# Patient Record
Sex: MALE | Race: WHITE | NOT HISPANIC OR LATINO
[De-identification: names, ages, dates, MRNs, and addresses within clinical notes are randomized per-mention and may not be internally consistent; named-entity substitution may affect disease eponyms.]

---

## 2019-02-25 PROBLEM — Z00.00 ENCOUNTER FOR PREVENTIVE HEALTH EXAMINATION: Status: ACTIVE | Noted: 2019-02-25

## 2019-02-26 ENCOUNTER — APPOINTMENT (OUTPATIENT)
Dept: CARDIOLOGY | Facility: CLINIC | Age: 69
End: 2019-02-26
Payer: MEDICARE

## 2019-02-26 VITALS
WEIGHT: 155 LBS | OXYGEN SATURATION: 96 % | DIASTOLIC BLOOD PRESSURE: 80 MMHG | HEART RATE: 52 BPM | SYSTOLIC BLOOD PRESSURE: 144 MMHG | BODY MASS INDEX: 23.49 KG/M2 | HEIGHT: 68 IN | RESPIRATION RATE: 16 BRPM

## 2019-02-26 PROCEDURE — 99204 OFFICE O/P NEW MOD 45 MIN: CPT

## 2019-02-26 RX ORDER — ASPIRIN 81 MG/1
81 TABLET ORAL
Qty: 30 | Refills: 11 | Status: ACTIVE | COMMUNITY
Start: 2019-02-26 | End: 1900-01-01

## 2019-02-26 NOTE — REVIEW OF SYSTEMS
[Negative] : Heme/Lymph [Dyspnea on exertion] : not dyspnea during exertion [Chest Pain] : no chest pain [Lower Ext Edema] : no extremity edema [Leg Claudication] : no intermittent leg claudication

## 2019-02-26 NOTE — REASON FOR VISIT
[Consultation] : a consultation regarding [FreeTextEntry2] : Aortic aneurysm [FreeTextEntry1] : Nephrology:  Dr. Payne [id number]\par Cardio:  Dr. Renee \par \par 69 year old male arrhythmia, HTN, renal insufficiency smoker.  He had an ultrasound of the renal arteries summer of 2018, concern for renal artery stenosis.  CT with IV contrast was performed:\par CT 2019 with IV contrast. \par Circumferential mural thrombus in the abdominal aorta\par Occluded proximal SMA with distal reconstitution\par ? thrombus in Left renal artery\par Possible thoracic aneurysm.  \par Left kidney:  7.6cm\par Right kidney:  10/3cm\par \par He is not on an aspirin.  No arterial stents.  50 + year smoker.  He is here for discussion of CT results.  Reports that his father  of a brain aneurysm.  States his BP at home is 120-130/70.  Here his BP is 144/80.   He states he has a leg length discrepancy, which causes him back pain.  No pain in his calf with walking.  \par \par Meds\par Amlodipine 10mg daily\par Metoprolol 25mg \par not on statin therapy \par \par NKDA\par \par Fmhx:  HTN\par \par Social: \par Rare EOTH\par Tobacco\par \par \par Labs:  Creatinine 1.35\par LDL:  119\par \par

## 2019-02-26 NOTE — PHYSICAL EXAM
[General Appearance - Well Developed] : well developed [Normal Appearance] : normal appearance [Well Groomed] : well groomed [General Appearance - Well Nourished] : well nourished [No Deformities] : no deformities [General Appearance - In No Acute Distress] : no acute distress [Normal Conjunctiva] : the conjunctiva exhibited no abnormalities [Eyelids - No Xanthelasma] : the eyelids demonstrated no xanthelasmas [Normal Oral Mucosa] : normal oral mucosa [No Oral Pallor] : no oral pallor [No Oral Cyanosis] : no oral cyanosis [Normal Jugular Venous A Waves Present] : normal jugular venous A waves present [Normal Jugular Venous V Waves Present] : normal jugular venous V waves present [No Jugular Venous Mckee A Waves] : no jugular venous mckee A waves [Heart Rate And Rhythm] : heart rate and rhythm were normal [Heart Sounds] : normal S1 and S2 [Murmurs] : no murmurs present [Respiration, Rhythm And Depth] : normal respiratory rhythm and effort [Exaggerated Use Of Accessory Muscles For Inspiration] : no accessory muscle use [Auscultation Breath Sounds / Voice Sounds] : lungs were clear to auscultation bilaterally [Abdomen Soft] : soft [Abdomen Tenderness] : non-tender [Abdomen Mass (___ Cm)] : no abdominal mass palpated [Abnormal Walk] : normal gait [Gait - Sufficient For Exercise Testing] : the gait was sufficient for exercise testing [Nail Clubbing] : no clubbing of the fingernails [Cyanosis, Localized] : no localized cyanosis [Petechial Hemorrhages (___cm)] : no petechial hemorrhages [Skin Color & Pigmentation] : normal skin color and pigmentation [] : no rash [No Venous Stasis] : no venous stasis [Skin Lesions] : no skin lesions [No Skin Ulcers] : no skin ulcer [No Xanthoma] : no  xanthoma was observed [Oriented To Time, Place, And Person] : oriented to person, place, and time [Affect] : the affect was normal [Mood] : the mood was normal [No Anxiety] : not feeling anxious

## 2019-02-26 NOTE — ASSESSMENT
[FreeTextEntry1] : Assessment:\par 1. AAA 3.5 cm\par 2.  Aortic Mural thrombus\par 3.  SMA stenosis\par 4.  L renal artery stenosis with decreased kidney size\par 5.  CKD\par 6.  heavy active smoker\par \par \par Plan:\par 1.  Start aspirin 81mg daily\par 2.  Start high intensity statin therapy\par 3.  CT Angio chest, abdomen and pelvis to evaluate entire aorta and vasculature\par 4.  Smoking cessation. \par 5.  Return after #3 to review images

## 2019-03-05 ENCOUNTER — APPOINTMENT (OUTPATIENT)
Dept: CT IMAGING | Facility: CLINIC | Age: 69
End: 2019-03-05
Payer: MEDICARE

## 2019-03-05 ENCOUNTER — OUTPATIENT (OUTPATIENT)
Dept: OUTPATIENT SERVICES | Facility: HOSPITAL | Age: 69
LOS: 1 days | End: 2019-03-05
Payer: MEDICARE

## 2019-03-05 DIAGNOSIS — I71.4 ABDOMINAL AORTIC ANEURYSM, WITHOUT RUPTURE: ICD-10-CM

## 2019-03-05 DIAGNOSIS — I71.2 THORACIC AORTIC ANEURYSM, WITHOUT RUPTURE: ICD-10-CM

## 2019-03-05 PROCEDURE — 74174 CTA ABD&PLVS W/CONTRAST: CPT

## 2019-03-05 PROCEDURE — 71275 CT ANGIOGRAPHY CHEST: CPT | Mod: 26

## 2019-03-05 PROCEDURE — 82565 ASSAY OF CREATININE: CPT

## 2019-03-05 PROCEDURE — 74174 CTA ABD&PLVS W/CONTRAST: CPT | Mod: 26

## 2019-03-05 PROCEDURE — 71275 CT ANGIOGRAPHY CHEST: CPT

## 2019-03-12 ENCOUNTER — APPOINTMENT (OUTPATIENT)
Dept: CARDIOLOGY | Facility: CLINIC | Age: 69
End: 2019-03-12
Payer: MEDICARE

## 2019-03-12 VITALS
WEIGHT: 156 LBS | SYSTOLIC BLOOD PRESSURE: 130 MMHG | OXYGEN SATURATION: 98 % | HEIGHT: 68 IN | RESPIRATION RATE: 16 BRPM | BODY MASS INDEX: 23.64 KG/M2 | DIASTOLIC BLOOD PRESSURE: 76 MMHG | HEART RATE: 58 BPM

## 2019-03-12 PROCEDURE — 99214 OFFICE O/P EST MOD 30 MIN: CPT

## 2019-03-12 NOTE — ASSESSMENT
[FreeTextEntry1] : Assessment:\par 1. AAA 3.5 cm\par 2.  Aortic Mural thrombus\par 3.  SMA stenosis\par 4.  L renal artery stenosis with decreased kidney size\par 5.  CKD\par 6.  heavy active smoker\par \par \par Plan:\par 1.  Continue aspirin 81mg daily\par 2.  Continue with high intensity statin therapy\par 3.  MR Angio chest, abdomen and pelvis in 3 months for surveillance.   \par 4.  Smoking cessation stressed again.  He must stop smoking.  \par 5. See me after MRA in 3 months.

## 2019-03-12 NOTE — REASON FOR VISIT
[Follow-Up - Clinic] : a clinic follow-up of [FreeTextEntry2] : Aortic aneurysm [FreeTextEntry1] : 69 year old male arrhythmia, HTN, renal insufficiency smoker.

## 2019-03-12 NOTE — HISTORY OF PRESENT ILLNESS
[FreeTextEntry1] : followup visit:  3/12/2019\par Still smoking\par Started aspirin 81 and Crestor 40mg \par Has not quit\par No CP or SOB.\par Most recent creatinine is 1.57\par No abdominal pain. No claudication. \par \par \par Amlodipine 10mg daily\par Metoprolol 25mg BID\par Aspirin 81\par Crestor 40\par \par CTA:  \par VESSELS: Atherosclerotic disease throughout the abdominal aorta and its \par branches with extensive mural thrombus. At the level of the renal arteries, \par the aorta measures up to 3.7 cm. There is moderate to severe ostial \par narrowing of the celiac artery. A proximal SMA thrombosed aneurysm measures \par up to 1.1 cm in transverse dimension with reconstitution in the mid segment. \par The inferior mesenteric artery is demonstrated high-grade stenosis/occlusion \par at its origin, with reconstitution in the proximal segment.

## 2019-03-12 NOTE — PHYSICAL EXAM
[General Appearance - Well Developed] : well developed [Normal Appearance] : normal appearance [Well Groomed] : well groomed [General Appearance - Well Nourished] : well nourished [No Deformities] : no deformities [General Appearance - In No Acute Distress] : no acute distress [Normal Conjunctiva] : the conjunctiva exhibited no abnormalities [Eyelids - No Xanthelasma] : the eyelids demonstrated no xanthelasmas [Normal Oral Mucosa] : normal oral mucosa [No Oral Pallor] : no oral pallor [No Oral Cyanosis] : no oral cyanosis [Normal Jugular Venous A Waves Present] : normal jugular venous A waves present [Normal Jugular Venous V Waves Present] : normal jugular venous V waves present [No Jugular Venous Mckee A Waves] : no jugular venous mckee A waves [Respiration, Rhythm And Depth] : normal respiratory rhythm and effort [Exaggerated Use Of Accessory Muscles For Inspiration] : no accessory muscle use [Auscultation Breath Sounds / Voice Sounds] : lungs were clear to auscultation bilaterally [Heart Rate And Rhythm] : heart rate and rhythm were normal [Heart Sounds] : normal S1 and S2 [Murmurs] : no murmurs present [Abdomen Soft] : soft [Abdomen Tenderness] : non-tender [Abdomen Mass (___ Cm)] : no abdominal mass palpated [Abnormal Walk] : normal gait [Gait - Sufficient For Exercise Testing] : the gait was sufficient for exercise testing [Nail Clubbing] : no clubbing of the fingernails [Cyanosis, Localized] : no localized cyanosis [Petechial Hemorrhages (___cm)] : no petechial hemorrhages [Skin Color & Pigmentation] : normal skin color and pigmentation [] : no rash [No Venous Stasis] : no venous stasis [Skin Lesions] : no skin lesions [No Skin Ulcers] : no skin ulcer [No Xanthoma] : no  xanthoma was observed [Oriented To Time, Place, And Person] : oriented to person, place, and time [Affect] : the affect was normal [Mood] : the mood was normal [No Anxiety] : not feeling anxious

## 2019-05-28 ENCOUNTER — RX RENEWAL (OUTPATIENT)
Age: 69
End: 2019-05-28

## 2019-05-28 RX ORDER — ROSUVASTATIN CALCIUM 40 MG/1
40 TABLET, FILM COATED ORAL
Qty: 90 | Refills: 2 | Status: ACTIVE | COMMUNITY
Start: 2019-02-26 | End: 1900-01-01

## 2019-06-02 ENCOUNTER — APPOINTMENT (OUTPATIENT)
Dept: MRI IMAGING | Facility: CLINIC | Age: 69
End: 2019-06-02
Payer: MEDICARE

## 2019-06-02 ENCOUNTER — OUTPATIENT (OUTPATIENT)
Dept: OUTPATIENT SERVICES | Facility: HOSPITAL | Age: 69
LOS: 1 days | End: 2019-06-02
Payer: MEDICARE

## 2019-06-02 DIAGNOSIS — Z00.8 ENCOUNTER FOR OTHER GENERAL EXAMINATION: ICD-10-CM

## 2019-06-02 PROCEDURE — 72198 MR ANGIO PELVIS W/O & W/DYE: CPT | Mod: 26

## 2019-06-02 PROCEDURE — C8900: CPT

## 2019-06-02 PROCEDURE — A9585: CPT

## 2019-06-02 PROCEDURE — 74185 MRA ABD W OR W/O CNTRST: CPT | Mod: 26

## 2019-06-02 PROCEDURE — C8918: CPT

## 2019-06-12 ENCOUNTER — APPOINTMENT (OUTPATIENT)
Dept: MRI IMAGING | Facility: CLINIC | Age: 69
End: 2019-06-12
Payer: MEDICARE

## 2019-06-12 ENCOUNTER — OUTPATIENT (OUTPATIENT)
Dept: OUTPATIENT SERVICES | Facility: HOSPITAL | Age: 69
LOS: 1 days | End: 2019-06-12
Payer: MEDICARE

## 2019-06-12 DIAGNOSIS — Z00.8 ENCOUNTER FOR OTHER GENERAL EXAMINATION: ICD-10-CM

## 2019-06-12 PROCEDURE — 71555 MRI ANGIO CHEST W OR W/O DYE: CPT | Mod: 26

## 2019-06-12 PROCEDURE — C8911: CPT

## 2019-06-12 PROCEDURE — A9585: CPT

## 2019-06-18 ENCOUNTER — APPOINTMENT (OUTPATIENT)
Dept: CARDIOLOGY | Facility: CLINIC | Age: 69
End: 2019-06-18
Payer: MEDICARE

## 2019-06-18 VITALS
DIASTOLIC BLOOD PRESSURE: 72 MMHG | HEART RATE: 56 BPM | HEIGHT: 68 IN | WEIGHT: 152 LBS | SYSTOLIC BLOOD PRESSURE: 152 MMHG | OXYGEN SATURATION: 99 % | BODY MASS INDEX: 23.04 KG/M2 | RESPIRATION RATE: 16 BRPM

## 2019-06-18 PROCEDURE — 99215 OFFICE O/P EST HI 40 MIN: CPT

## 2019-06-18 NOTE — PHYSICAL EXAM
[General Appearance - Well Developed] : well developed [Normal Appearance] : normal appearance [Well Groomed] : well groomed [General Appearance - Well Nourished] : well nourished [No Deformities] : no deformities [General Appearance - In No Acute Distress] : no acute distress [Normal Conjunctiva] : the conjunctiva exhibited no abnormalities [Normal Oral Mucosa] : normal oral mucosa [Eyelids - No Xanthelasma] : the eyelids demonstrated no xanthelasmas [No Oral Pallor] : no oral pallor [No Oral Cyanosis] : no oral cyanosis [Normal Jugular Venous A Waves Present] : normal jugular venous A waves present [Normal Jugular Venous V Waves Present] : normal jugular venous V waves present [No Jugular Venous Mckee A Waves] : no jugular venous mckee A waves [Respiration, Rhythm And Depth] : normal respiratory rhythm and effort [Exaggerated Use Of Accessory Muscles For Inspiration] : no accessory muscle use [Auscultation Breath Sounds / Voice Sounds] : lungs were clear to auscultation bilaterally [Heart Sounds] : normal S1 and S2 [Heart Rate And Rhythm] : heart rate and rhythm were normal [Murmurs] : no murmurs present [Abdomen Soft] : soft [Abdomen Tenderness] : non-tender [Abdomen Mass (___ Cm)] : no abdominal mass palpated [Abnormal Walk] : normal gait [Gait - Sufficient For Exercise Testing] : the gait was sufficient for exercise testing [Nail Clubbing] : no clubbing of the fingernails [Cyanosis, Localized] : no localized cyanosis [Petechial Hemorrhages (___cm)] : no petechial hemorrhages [Skin Color & Pigmentation] : normal skin color and pigmentation [] : no rash [No Venous Stasis] : no venous stasis [Skin Lesions] : no skin lesions [No Skin Ulcers] : no skin ulcer [No Xanthoma] : no  xanthoma was observed [Oriented To Time, Place, And Person] : oriented to person, place, and time [Affect] : the affect was normal [Mood] : the mood was normal [No Anxiety] : not feeling anxious

## 2019-06-20 NOTE — ASSESSMENT
[FreeTextEntry1] : Assessment:\par 1. AAA 3.5 cm\par 2.  Aortic Mural thrombus\par 3.  SMA stenosis\par 4.  L renal artery stenosis with decreased kidney size\par 5.  CKD\par 6.  heavy active smoker\par \par \par Plan:\par 1.  Continue aspirin 81mg daily\par 2.  Continue with high intensity statin therapy\par 3.  Continue smoking cessation.\par 4.  Discussed with Dr. Payne (nephrology) and Dr. Renee, he has L renal artery stenosis, now with uptrending creatinine, it is reasonable to attempt to salvage the L kidney and proceed with L renal artery angio and intervention\par 5.  In the future will need to discuss role for celiac artery intervention as he has post prandial pains and SMA /EZRA are occluded.\par 6.  Return after L renal intervention. \par

## 2019-06-20 NOTE — HISTORY OF PRESENT ILLNESS
[FreeTextEntry1] : Followup visit 6/18/2019\par \par MRA abdomen/pelvis performed on 6/2/2019 - Diffuse atheromatous ectasia of the abdominal aorta.  Prominent plaque ulcerations without focal aneurysm.  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  \par \par Severe L renal artery stenosis.  \par \par He has stopped smoking.  here with his wife.  Called Dr. Barros office, most recent creatinine 1.57 in March 2019.  \par Complains of post prandial pains.  \par \par followup visit:  3/12/2019\par Still smoking\par Started aspirin 81 and Crestor 40mg \par Has not quit\par No CP or SOB.\par Most recent creatinine is 1.57\par No abdominal pain. No claudication. \par \par \par Amlodipine 10mg daily\par Metoprolol 25mg BID\par Aspirin 81\par Crestor 40\par \par CTA:  \par VESSELS: Atherosclerotic disease throughout the abdominal aorta and its \par branches with extensive mural thrombus. At the level of the renal arteries, \par the aorta measures up to 3.7 cm. There is moderate to severe ostial \par narrowing of the celiac artery. A proximal SMA thrombosed aneurysm measures \par up to 1.1 cm in transverse dimension with reconstitution in the mid segment. \par The inferior mesenteric artery is demonstrated high-grade stenosis/occlusion \par at its origin, with reconstitution in the proximal segment.

## 2019-06-27 ENCOUNTER — INPATIENT (INPATIENT)
Facility: HOSPITAL | Age: 69
LOS: 0 days | Discharge: HOME | DRG: 674 | End: 2019-06-28
Attending: INTERNAL MEDICINE | Admitting: INTERNAL MEDICINE
Payer: MEDICARE

## 2019-06-27 ENCOUNTER — TRANSCRIPTION ENCOUNTER (OUTPATIENT)
Age: 69
End: 2019-06-27

## 2019-06-27 VITALS
DIASTOLIC BLOOD PRESSURE: 74 MMHG | HEIGHT: 67 IN | TEMPERATURE: 98 F | WEIGHT: 149.91 LBS | RESPIRATION RATE: 14 BRPM | HEART RATE: 61 BPM | OXYGEN SATURATION: 99 % | SYSTOLIC BLOOD PRESSURE: 165 MMHG

## 2019-06-27 DIAGNOSIS — Z98.890 OTHER SPECIFIED POSTPROCEDURAL STATES: Chronic | ICD-10-CM

## 2019-06-27 DIAGNOSIS — N18.5 CHRONIC KIDNEY DISEASE, STAGE 5: ICD-10-CM

## 2019-06-27 LAB
ALBUMIN SERPL ELPH-MCNC: 3.7 G/DL — SIGNIFICANT CHANGE UP (ref 3.3–5)
ALBUMIN SERPL ELPH-MCNC: 4.1 G/DL — SIGNIFICANT CHANGE UP (ref 3.3–5)
ALP SERPL-CCNC: 73 U/L — SIGNIFICANT CHANGE UP (ref 40–120)
ALP SERPL-CCNC: 78 U/L — SIGNIFICANT CHANGE UP (ref 40–120)
ALT FLD-CCNC: 9 U/L — LOW (ref 10–45)
ALT FLD-CCNC: 9 U/L — LOW (ref 10–45)
ANION GAP SERPL CALC-SCNC: 13 MMOL/L — SIGNIFICANT CHANGE UP (ref 5–17)
ANION GAP SERPL CALC-SCNC: 15 MMOL/L — SIGNIFICANT CHANGE UP (ref 5–17)
AST SERPL-CCNC: 16 U/L — SIGNIFICANT CHANGE UP (ref 10–40)
AST SERPL-CCNC: 16 U/L — SIGNIFICANT CHANGE UP (ref 10–40)
BILIRUB SERPL-MCNC: 0.4 MG/DL — SIGNIFICANT CHANGE UP (ref 0.2–1.2)
BILIRUB SERPL-MCNC: 0.4 MG/DL — SIGNIFICANT CHANGE UP (ref 0.2–1.2)
BUN SERPL-MCNC: 31 MG/DL — HIGH (ref 7–23)
BUN SERPL-MCNC: 31 MG/DL — HIGH (ref 7–23)
CALCIUM SERPL-MCNC: 9 MG/DL — SIGNIFICANT CHANGE UP (ref 8.4–10.5)
CALCIUM SERPL-MCNC: 9.9 MG/DL — SIGNIFICANT CHANGE UP (ref 8.4–10.5)
CHLORIDE SERPL-SCNC: 102 MMOL/L — SIGNIFICANT CHANGE UP (ref 96–108)
CHLORIDE SERPL-SCNC: 104 MMOL/L — SIGNIFICANT CHANGE UP (ref 96–108)
CO2 SERPL-SCNC: 22 MMOL/L — SIGNIFICANT CHANGE UP (ref 22–31)
CO2 SERPL-SCNC: 23 MMOL/L — SIGNIFICANT CHANGE UP (ref 22–31)
CREAT SERPL-MCNC: 2.27 MG/DL — HIGH (ref 0.5–1.3)
CREAT SERPL-MCNC: 2.49 MG/DL — HIGH (ref 0.5–1.3)
GLUCOSE SERPL-MCNC: 109 MG/DL — HIGH (ref 70–99)
GLUCOSE SERPL-MCNC: 114 MG/DL — HIGH (ref 70–99)
HCT VFR BLD CALC: 25 % — LOW (ref 39–50)
HCT VFR BLD CALC: 27.6 % — LOW (ref 39–50)
HGB BLD-MCNC: 8.7 G/DL — LOW (ref 13–17)
HGB BLD-MCNC: 9.4 G/DL — LOW (ref 13–17)
MCHC RBC-ENTMCNC: 30.8 PG — SIGNIFICANT CHANGE UP (ref 27–34)
MCHC RBC-ENTMCNC: 31.7 PG — SIGNIFICANT CHANGE UP (ref 27–34)
MCHC RBC-ENTMCNC: 33.9 GM/DL — SIGNIFICANT CHANGE UP (ref 32–36)
MCHC RBC-ENTMCNC: 35 GM/DL — SIGNIFICANT CHANGE UP (ref 32–36)
MCV RBC AUTO: 90.6 FL — SIGNIFICANT CHANGE UP (ref 80–100)
MCV RBC AUTO: 90.8 FL — SIGNIFICANT CHANGE UP (ref 80–100)
PLATELET # BLD AUTO: 174 K/UL — SIGNIFICANT CHANGE UP (ref 150–400)
PLATELET # BLD AUTO: 202 K/UL — SIGNIFICANT CHANGE UP (ref 150–400)
POTASSIUM SERPL-MCNC: 3.4 MMOL/L — LOW (ref 3.5–5.3)
POTASSIUM SERPL-MCNC: 4.4 MMOL/L — SIGNIFICANT CHANGE UP (ref 3.5–5.3)
POTASSIUM SERPL-SCNC: 3.4 MMOL/L — LOW (ref 3.5–5.3)
POTASSIUM SERPL-SCNC: 4.4 MMOL/L — SIGNIFICANT CHANGE UP (ref 3.5–5.3)
PROT SERPL-MCNC: 7 G/DL — SIGNIFICANT CHANGE UP (ref 6–8.3)
PROT SERPL-MCNC: 7.2 G/DL — SIGNIFICANT CHANGE UP (ref 6–8.3)
RBC # BLD: 2.76 M/UL — LOW (ref 4.2–5.8)
RBC # BLD: 3.04 M/UL — LOW (ref 4.2–5.8)
RBC # FLD: 13 % — SIGNIFICANT CHANGE UP (ref 10.3–14.5)
RBC # FLD: 13.1 % — SIGNIFICANT CHANGE UP (ref 10.3–14.5)
SODIUM SERPL-SCNC: 139 MMOL/L — SIGNIFICANT CHANGE UP (ref 135–145)
SODIUM SERPL-SCNC: 140 MMOL/L — SIGNIFICANT CHANGE UP (ref 135–145)
WBC # BLD: 10 K/UL — SIGNIFICANT CHANGE UP (ref 3.8–10.5)
WBC # BLD: 8.2 K/UL — SIGNIFICANT CHANGE UP (ref 3.8–10.5)
WBC # FLD AUTO: 10 K/UL — SIGNIFICANT CHANGE UP (ref 3.8–10.5)
WBC # FLD AUTO: 8.2 K/UL — SIGNIFICANT CHANGE UP (ref 3.8–10.5)

## 2019-06-27 PROCEDURE — 37238 OPEN/PERQ PLACE STENT SAME: CPT

## 2019-06-27 PROCEDURE — 36252 INS CATH REN ART 1ST BILAT: CPT

## 2019-06-27 PROCEDURE — 99223 1ST HOSP IP/OBS HIGH 75: CPT

## 2019-06-27 PROCEDURE — 93010 ELECTROCARDIOGRAM REPORT: CPT | Mod: 76

## 2019-06-27 RX ORDER — ATORVASTATIN CALCIUM 80 MG/1
80 TABLET, FILM COATED ORAL AT BEDTIME
Refills: 0 | Status: DISCONTINUED | OUTPATIENT
Start: 2019-06-27 | End: 2019-06-28

## 2019-06-27 RX ORDER — HYDRALAZINE HCL 50 MG
25 TABLET ORAL THREE TIMES A DAY
Refills: 0 | Status: DISCONTINUED | OUTPATIENT
Start: 2019-06-27 | End: 2019-06-27

## 2019-06-27 RX ORDER — CLOPIDOGREL BISULFATE 75 MG/1
75 TABLET, FILM COATED ORAL DAILY
Refills: 0 | Status: DISCONTINUED | OUTPATIENT
Start: 2019-06-27 | End: 2019-06-28

## 2019-06-27 RX ORDER — AMLODIPINE BESYLATE 2.5 MG/1
10 TABLET ORAL DAILY
Refills: 0 | Status: DISCONTINUED | OUTPATIENT
Start: 2019-06-27 | End: 2019-06-28

## 2019-06-27 RX ORDER — ASPIRIN/CALCIUM CARB/MAGNESIUM 324 MG
81 TABLET ORAL DAILY
Refills: 0 | Status: DISCONTINUED | OUTPATIENT
Start: 2019-06-27 | End: 2019-06-28

## 2019-06-27 RX ORDER — SODIUM CHLORIDE 9 MG/ML
1000 INJECTION INTRAMUSCULAR; INTRAVENOUS; SUBCUTANEOUS
Refills: 0 | Status: DISCONTINUED | OUTPATIENT
Start: 2019-06-27 | End: 2019-06-28

## 2019-06-27 RX ORDER — CLOPIDOGREL BISULFATE 75 MG/1
1 TABLET, FILM COATED ORAL
Qty: 30 | Refills: 11
Start: 2019-06-27 | End: 2020-06-20

## 2019-06-27 RX ORDER — HYDRALAZINE HCL 50 MG
1 TABLET ORAL
Qty: 0 | Refills: 0 | DISCHARGE
Start: 2019-06-27

## 2019-06-27 RX ORDER — SODIUM CHLORIDE 9 MG/ML
250 INJECTION INTRAMUSCULAR; INTRAVENOUS; SUBCUTANEOUS ONCE
Refills: 0 | Status: COMPLETED | OUTPATIENT
Start: 2019-06-27 | End: 2019-06-27

## 2019-06-27 RX ORDER — METOPROLOL TARTRATE 50 MG
25 TABLET ORAL
Refills: 0 | Status: DISCONTINUED | OUTPATIENT
Start: 2019-06-27 | End: 2019-06-27

## 2019-06-27 RX ORDER — HYDRALAZINE HCL 50 MG
25 TABLET ORAL THREE TIMES A DAY
Refills: 0 | Status: DISCONTINUED | OUTPATIENT
Start: 2019-06-27 | End: 2019-06-28

## 2019-06-27 RX ADMIN — CLOPIDOGREL BISULFATE 75 MILLIGRAM(S): 75 TABLET, FILM COATED ORAL at 16:19

## 2019-06-27 RX ADMIN — SODIUM CHLORIDE 750 MILLILITER(S): 9 INJECTION INTRAMUSCULAR; INTRAVENOUS; SUBCUTANEOUS at 16:20

## 2019-06-27 RX ADMIN — SODIUM CHLORIDE 75 MILLILITER(S): 9 INJECTION INTRAMUSCULAR; INTRAVENOUS; SUBCUTANEOUS at 16:20

## 2019-06-27 RX ADMIN — AMLODIPINE BESYLATE 10 MILLIGRAM(S): 2.5 TABLET ORAL at 16:18

## 2019-06-27 RX ADMIN — ATORVASTATIN CALCIUM 80 MILLIGRAM(S): 80 TABLET, FILM COATED ORAL at 21:15

## 2019-06-27 RX ADMIN — Medication 81 MILLIGRAM(S): at 16:17

## 2019-06-27 RX ADMIN — Medication 25 MILLIGRAM(S): at 21:15

## 2019-06-27 NOTE — DISCHARGE NOTE PROVIDER - HOSPITAL COURSE
68 y/o Male with HTN, renal insufficiency, smoker presents today for Renal angiogram for Renal artery stenosis. Patient was diagnosed with HTN and Sonogram of the knideny showed renal artery stenosis and further MR angio of pelvis and Abdomen confirmed there severe left renal arterey stenosis and referred for peripheral angiogram by Dr. Villalpando. MRA abdomen/pelvis performed on 6/2/2019 - Diffuse atheromatous ectasia of the abdominal aorta. Prominent plaque ulcerations without focal aneurysm. 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  Severe L renal artery stenosis. Pt tolerated the procedure well site benign. Post-procedure discharge instructions discussed and questions addressed 68 y/o Male with HTN, renal insufficiency, smoker presents today for Renal angiogram for Renal artery stenosis. Patient was diagnosed with HTN and Sonogram of the knideny showed renal artery stenosis and further MR angio of pelvis and Abdomen confirmed there severe left renal arterey stenosis and referred for peripheral angiogram by Dr. Villalpando. MRA abdomen/pelvis performed on 6/2/2019 - Diffuse atheromatous ectasia of the abdominal aorta. Prominent plaque ulcerations without focal aneurysm. 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  Severe L renal artery stenosis. Pt tolerated the procedure well. Post-procedure discharge instructions discussed and questions addressed         small hematoma noted right groin 6/28     manully compressed w resolution    +distal pulses intact 70 y/o Male with HTN, renal insufficiency, smoker presents today for Renal angiogram for Renal artery stenosis. Patient was diagnosed with HTN and Sonogram of the knideny showed renal artery stenosis and further MR angio of pelvis and Abdomen confirmed there severe left renal arterey stenosis and referred for peripheral angiogram by Dr. Villalpando. MRA abdomen/pelvis performed on 6/2/2019 - Diffuse atheromatous ectasia of the abdominal aorta. Prominent plaque ulcerations without focal aneurysm. 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  Severe L renal artery stenosis. Pt tolerated the procedure well. Post-procedure discharge instructions discussed and questions addressed         small hematoma noted right groin 6/28 manully compressed w resolution +distal pulses intact        Update - 12:30 pm     ambulated around unit     groin site stable - no hematoma or bleeding. no pain +DP

## 2019-06-27 NOTE — DISCHARGE NOTE PROVIDER - NSDCCPTREATMENT_GEN_ALL_CORE_FT
PRINCIPAL PROCEDURE  Procedure: Angioplasty, artery, renal  Findings and Treatment: s/p left renal angioplasty PRINCIPAL PROCEDURE  Procedure: Angioplasty, artery, renal  Findings and Treatment: s/p left renal angioplasty  No heavy lifting, strenuous activity, bending, straining or unneccessary stair climbing  for 2 weeks. No sex for 1 week.  No driving for 2 days. You may shower 24 hours following procedure but avoid baths and swimming for 1 week. Check groin site for bleeding and/or swelling daily following procedure. Call your doctor/cardiologist immediately should it occur or if you have increased/persistent pain at the site. Follow up with your cardiologist in 1- 2 weeks. You may call [facility] at [phone] or [phone] after office hours and weekends  with any questions or concerns following your procedure. Take medications as prescribed.

## 2019-06-27 NOTE — DISCHARGE NOTE PROVIDER - CARE PROVIDERS DIRECT ADDRESSES
[email or web address]

## 2019-06-27 NOTE — DISCHARGE NOTE PROVIDER - NSDCCPCAREPLAN_GEN_ALL_CORE_FT
PRINCIPAL DISCHARGE DIAGNOSIS  Diagnosis: ARMANI (renal artery stenosis)  Assessment and Plan of Treatment: Pt remains symptom  free and understands post procedure  discharge instructions   Pt remains chest pain free and understands post cath discharge instructions

## 2019-06-27 NOTE — DISCHARGE NOTE PROVIDER - CARE PROVIDER_API CALL
Carmelo Dunaway (MD)  Cardiovascular Disease; Endovascular Medicine; Interventional Cardiology; Vascular Medicine  [address] [place] [address]  Phone: [phone]  Fax: [phone]  Follow Up Time: Amarjit Villalpando (DO)  Internal Medicine; Nuclear Cardiology  [address] [place] [address]  Phone: [phone]  Fax: [phone]  Follow Up Time:

## 2019-06-27 NOTE — CHART NOTE - NSCHARTNOTEFT_GEN_A_CORE
Removal of Femoral Sheath    Pulses in the right/left lower extremity are palpable/audible by doppler/absent.   The patient was placed in the supine position. The insertion site was identified and the sutures were removed per protocol.    The __6__ Bengali femoral sheath was then removed.   Direct pressure was applied for  ____20__ minutes.   Complications: None, VSS, Good Hemostasis.     Monitoring of the right/left groin and both lower extremities including neuro-vascular checks and vital signs every 15 minutes x 4, then every 30 minutes x 2, then every 1 hour was ordered.    Discharge Instruction discussed with patient: ASA, Plavix/Brilinta, statin, diet, activities, access site care, follow up care, reportable signs and symptoms.     A/P      70 y/o Male with HTN, renal insufficiency, smoker presents today for Renal angiogram for Renal artery stenosis. Patient was diagnosed with HTN and Sonogram of the knideny showed renal artery stenosis and further MR angio of pelvis and Abdomen confirmed there severe left renal arterey stenosis and referred for peripheral angiogram by Dr. Villalpando.  MRA abdomen/pelvis performed on 6/2/2019 - Diffuse atheromatous ectasia of the abdominal aorta. Prominent plaque ulcerations without focal aneurysm. 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   Severe L renal artery stenosis.   Card: Dr. Renee  Renal: Dr. Payne    S/P Left renal stent.    Plan: continue to monitor, Continue ASA, Plavix, Statin,   discharge home in am if stable.  Pt will follow up with his/her cardiologist in 1-2weeks.

## 2019-06-27 NOTE — H&P CARDIOLOGY - PMH
HLD (hyperlipidemia)    HTN (hypertension) AAA (abdominal aortic aneurysm)  3.5 CM  CKD (chronic kidney disease)    HLD (hyperlipidemia)    HTN (hypertension)    Renal artery stenosis    Smoker    Smoker

## 2019-06-27 NOTE — H&P CARDIOLOGY - HISTORY OF PRESENT ILLNESS
68 y/o Male with HTN, renal insufficiency, smoker presents today for Renal angiogram for Renal artery stenosis. Patient was diagnosed with HTN and Sonogram of the knideny showed renal artery stenosis and further MR angio of pelvis and Abdomen confirmed there severe left renal arterey stenosis and referred for peripheral angiogram by Dr. Villalpando.  MRA abdomen/pelvis performed on 6/2/2019 - Diffuse atheromatous ectasia of the abdominal aorta. Prominent plaque ulcerations without focal aneurysm. 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   Severe L renal artery stenosis.   Card: Dr. Reene  Renal: Dr. Payne    MR Angio chest from June 2019:  1.  No aortic aneurysm.  2.  Extensive atherosclerosis of the descending aorta. Several small   penetrating ulcers through the course of the descending aorta some of   which are more conspicuous compared to prior chest CT which can be   secondary to different technique.    MR Angio of Abdomen: IMPRESSION:   Diffuse atheromatous ectasia of the abdominal aorta with prominent plaque   ulcerations but without focal aneurysm.  Severe mesenteric arterial disease, with the mesenteric circulation   supplied by a stenotic celiac artery. Proximal occlusions of the SMA and   EZRA. Unchanged since CT of 3/5/2019.  Severe left renal artery stenosis    MR Angio of pelvis: MPRESSION:   Diffuse atheromatous ectasia of the abdominal aorta with prominent plaque   ulcerations but without focal aneurysm.  Severe mesenteric arterial disease, with the mesenteric circulation   supplied by a stenotic celiac artery. Proximal occlusions of the SMA and   EZRA. Unchanged since CT of 3/5/2019.  Severe left renal artery stenosis.

## 2019-06-28 ENCOUNTER — TRANSCRIPTION ENCOUNTER (OUTPATIENT)
Age: 69
End: 2019-06-28

## 2019-06-28 VITALS
TEMPERATURE: 98 F | SYSTOLIC BLOOD PRESSURE: 123 MMHG | DIASTOLIC BLOOD PRESSURE: 67 MMHG | HEART RATE: 59 BPM | RESPIRATION RATE: 17 BRPM | OXYGEN SATURATION: 98 %

## 2019-06-28 LAB
ANION GAP SERPL CALC-SCNC: 10 MMOL/L — SIGNIFICANT CHANGE UP (ref 5–17)
BUN SERPL-MCNC: 29 MG/DL — HIGH (ref 7–23)
CALCIUM SERPL-MCNC: 9.3 MG/DL — SIGNIFICANT CHANGE UP (ref 8.4–10.5)
CHLORIDE SERPL-SCNC: 104 MMOL/L — SIGNIFICANT CHANGE UP (ref 96–108)
CO2 SERPL-SCNC: 22 MMOL/L — SIGNIFICANT CHANGE UP (ref 22–31)
CREAT SERPL-MCNC: 2.22 MG/DL — HIGH (ref 0.5–1.3)
GLUCOSE SERPL-MCNC: 115 MG/DL — HIGH (ref 70–99)
HCT VFR BLD CALC: 25 % — LOW (ref 39–50)
HGB BLD-MCNC: 8.8 G/DL — LOW (ref 13–17)
MCHC RBC-ENTMCNC: 32.1 PG — SIGNIFICANT CHANGE UP (ref 27–34)
MCHC RBC-ENTMCNC: 35 GM/DL — SIGNIFICANT CHANGE UP (ref 32–36)
MCV RBC AUTO: 91.5 FL — SIGNIFICANT CHANGE UP (ref 80–100)
PLATELET # BLD AUTO: 161 K/UL — SIGNIFICANT CHANGE UP (ref 150–400)
POTASSIUM SERPL-MCNC: 4.1 MMOL/L — SIGNIFICANT CHANGE UP (ref 3.5–5.3)
POTASSIUM SERPL-SCNC: 4.1 MMOL/L — SIGNIFICANT CHANGE UP (ref 3.5–5.3)
RBC # BLD: 2.74 M/UL — LOW (ref 4.2–5.8)
RBC # FLD: 13.1 % — SIGNIFICANT CHANGE UP (ref 10.3–14.5)
SODIUM SERPL-SCNC: 136 MMOL/L — SIGNIFICANT CHANGE UP (ref 135–145)
WBC # BLD: 9.6 K/UL — SIGNIFICANT CHANGE UP (ref 3.8–10.5)
WBC # FLD AUTO: 9.6 K/UL — SIGNIFICANT CHANGE UP (ref 3.8–10.5)

## 2019-06-28 PROCEDURE — C1769: CPT

## 2019-06-28 PROCEDURE — 85027 COMPLETE CBC AUTOMATED: CPT

## 2019-06-28 PROCEDURE — C1725: CPT

## 2019-06-28 PROCEDURE — C1876: CPT

## 2019-06-28 PROCEDURE — 37238 OPEN/PERQ PLACE STENT SAME: CPT

## 2019-06-28 PROCEDURE — 80053 COMPREHEN METABOLIC PANEL: CPT

## 2019-06-28 PROCEDURE — 99232 SBSQ HOSP IP/OBS MODERATE 35: CPT

## 2019-06-28 PROCEDURE — C1894: CPT

## 2019-06-28 PROCEDURE — 36252 INS CATH REN ART 1ST BILAT: CPT

## 2019-06-28 PROCEDURE — C1887: CPT

## 2019-06-28 PROCEDURE — 80048 BASIC METABOLIC PNL TOTAL CA: CPT

## 2019-06-28 PROCEDURE — 99238 HOSP IP/OBS DSCHRG MGMT 30/<: CPT

## 2019-06-28 PROCEDURE — 93005 ELECTROCARDIOGRAM TRACING: CPT

## 2019-06-28 RX ORDER — METOPROLOL TARTRATE 50 MG
1 TABLET ORAL
Qty: 0 | Refills: 0 | DISCHARGE

## 2019-06-28 RX ORDER — HYDRALAZINE HCL 50 MG
1 TABLET ORAL
Qty: 90 | Refills: 0
Start: 2019-06-28 | End: 2019-07-27

## 2019-06-28 RX ORDER — HYDRALAZINE HCL 50 MG
10 TABLET ORAL THREE TIMES A DAY
Refills: 0 | Status: DISCONTINUED | OUTPATIENT
Start: 2019-06-28 | End: 2019-06-28

## 2019-06-28 RX ADMIN — CLOPIDOGREL BISULFATE 75 MILLIGRAM(S): 75 TABLET, FILM COATED ORAL at 05:36

## 2019-06-28 RX ADMIN — AMLODIPINE BESYLATE 10 MILLIGRAM(S): 2.5 TABLET ORAL at 05:37

## 2019-06-28 RX ADMIN — Medication 25 MILLIGRAM(S): at 05:37

## 2019-06-28 RX ADMIN — Medication 81 MILLIGRAM(S): at 05:36

## 2019-06-28 NOTE — CHART NOTE - NSCHARTNOTEFT_GEN_A_CORE
small hematoma size of quarter right groin  tender to deep palpitation  manually compressed x 15 mins  bedrest x 2 hours then ambulate if site stable  seen and evaluated by dr martines  VSS  CBC stable small hematoma size of quarter right groin  tender to deep palpitation  manually compressed x 15 mins  bedrest x 2 hours then ambulate if site stable  seen and evaluated by dr martines  VSS  CBC stable    update   right groin site benign   no hematoma, no bleeding  +DP ambulated around unit pain free

## 2019-06-28 NOTE — PROGRESS NOTE ADULT - ASSESSMENT
Patient is a 69y old  Male who presents with a chief complaint of renal artery stenosis (27 Jun 2019 18:58)  renal artery stent x 1 via right femoral artery access. Pt tolerated the procedure well, site benign. Overnight remained uneventful. Post-procedure discharge instructions discuss and questions addressed

## 2019-06-28 NOTE — DISCHARGE NOTE NURSING/CASE MANAGEMENT/SOCIAL WORK - NSDCDPATPORTLINK_GEN_ALL_CORE
You can access the [facility] Patient Portal, offered by [facility], by registering with the following website: [email or web address]

## 2019-06-28 NOTE — PROGRESS NOTE ADULT - SUBJECTIVE AND OBJECTIVE BOX
Vascular Cardiology  Progress note     SPECTRA [id number]              EMAIL [email or web address]   OFFICE [phone]    CC:  Renal Stenosis    INTERVAL HISTORY:  S/p Left renal artery stent.  no issues overnight.  Feels well.  There is a small, quartersized hematoma on exam.  Otherwise no complaints.  No CP or SOB.  Metoprolol was held due to bradycardia  Hydralazine was started  Plavix was given.   Creatinine shows improvement with IV fluids         Allergies    No Known Allergies    Intolerances    	    MEDICATIONS:  amLODIPine   Tablet 10 milliGRAM(s) Oral daily  aspirin enteric coated 81 milliGRAM(s) Oral daily  clopidogrel Tablet 75 milliGRAM(s) Oral daily  hydrALAZINE 10 milliGRAM(s) Oral three times a day            atorvastatin 80 milliGRAM(s) Oral at bedtime    sodium chloride 0.9%. 1000 milliLiter(s) IV Continuous <Continuous>      PAST MEDICAL & SURGICAL HISTORY:  Smoker  CKD (chronic kidney disease)  AAA (abdominal aortic aneurysm): 3.5 CM  Smoker  Renal artery stenosis  HTN (hypertension)  HLD (hyperlipidemia)  H/O knee surgery      FAMILY HISTORY:      SOCIAL HISTORY:  unchanged    REVIEW OF SYSTEMS:  CONSTITUTIONAL: No fever, weight loss, or fatigue  EYES: No eye pain, visual disturbances, or discharge  ENMT:  No difficulty hearing, tinnitus, vertigo; No sinus or throat pain  NECK: No pain or stiffness  RESPIRATORY: No cough, wheezing, chills or hemoptysis; No Shortness of Breath  CARDIOVASCULAR: No chest pain, palpitations, passing out, dizziness, or leg swelling  GASTROINTESTINAL: No abdominal or epigastric pain. No nausea, vomiting, or hematemesis; No diarrhea or constipation. No melena or hematochezia.  GENITOURINARY: No dysuria, frequency, hematuria, or incontinence  NEUROLOGICAL: No headaches, memory loss, loss of strength, numbness, or tremors  SKIN: No itching, burning, rashes, or lesions   LYMPH Nodes: No enlarged glands  ENDOCRINE: No heat or cold intolerance; No hair loss  MUSCULOSKELETAL: No joint pain or swelling; No muscle, back, or extremity pain  PSYCHIATRIC: No depression, anxiety, mood swings, or difficulty sleeping  HEME/LYMPH: No easy bruising, or bleeding gums  ALLERY AND IMMUNOLOGIC: No hives or eczema	    [ x] All others negative	  [ ] Unable to obtain    PHYSICAL EXAM:  T(C): 36.9 (06-28-19 @ 04:40), Max: 36.9 (06-28-19 @ 04:40)  HR: 59 (06-28-19 @ 04:40) (58 - 66)  BP: 123/67 (06-28-19 @ 04:40) (122/69 - 165/74)  RR: 17 (06-28-19 @ 04:40) (14 - 17)  SpO2: 98% (06-28-19 @ 04:40) (96% - 100%)  Wt(kg): --  I&O's Summary    27 Jun 2019 07:01  -  28 Jun 2019 07:00  --------------------------------------------------------  IN: 1335 mL / OUT: 1450 mL / NET: -115 mL    28 Jun 2019 07:01  -  28 Jun 2019 10:57  --------------------------------------------------------  IN: 240 mL / OUT: 0 mL / NET: 240 mL        Appearance: Normal	  HEENT:   Normal oral mucosa, PERRL, EOMI	  Carotid:  Right: No bruit    Left:  No bruit  Lymphatic: No lymphadenopathy  Cardiovascular: Normal S1 S2, No JVD, No murmurs, No edema  Respiratory: Lungs clear to auscultation	  Psychiatry: A & O x 3, Mood & affect appropriate  Gastrointestinal:  Soft, Non-tender, + BS	  Skin: No rashes, No ecchymoses, No cyanosis	  Neurologic: Non-focal  Extremities: Normal range of motion, No clubbing, cyanosis.   R groin, soft, small quarter sized hematoma    LABS:	 	    CBC Full  -  ( 28 Jun 2019 01:25 )  WBC Count : 9.6 K/uL  Hemoglobin : 8.8 g/dL  Hematocrit : 25.0 %  Platelet Count - Automated : 161 K/uL  Mean Cell Volume : 91.5 fl  Mean Cell Hemoglobin : 32.1 pg  Mean Cell Hemoglobin Concentration : 35.0 gm/dL  Auto Neutrophil # : x  Auto Lymphocyte # : x  Auto Monocyte # : x  Auto Eosinophil # : x  Auto Basophil # : x  Auto Neutrophil % : x  Auto Lymphocyte % : x  Auto Monocyte % : x  Auto Eosinophil % : x  Auto Basophil % : x    06-28    136  |  104  |  29<H>  ----------------------------<  115<H>  4.1   |  22  |  2.22<H>  06-27    139  |  104  |  31<H>  ----------------------------<  109<H>  3.4<L>   |  22  |  2.27<H>    Ca    9.3      28 Jun 2019 01:25  Ca    9.0      27 Jun 2019 15:10    TPro  7.0  /  Alb  3.7  /  TBili  0.4  /  DBili  x   /  AST  16  /  ALT  9<L>  /  AlkPhos  73  06-27  TPro  7.2  /  Alb  4.1  /  TBili  0.4  /  DBili  x   /  AST  16  /  ALT  9<L>  /  AlkPhos  78  06-27          Assessment:  1.  renal artery stenosis  2.  Atherosclerosis  3.  HTN  4.  Former smoker    Plan  1.  Doing well post procedure  2.  Continue with dual antiplatelet therapy.  Plavix needs to be sent to his pharmacy  3.  Stop metoprolol  4.  Hydralazine 10mg TID for BP control in addition to his amlodipine.  5.  Compress out small hematoma and observe into afternoon.  If stable, then d/c planning.   6  We discussed groin precuations (e.g. no heavy lifting, baths x 2 weeks).    7.  See me in 2 weeks.      Thanks    Amarjit Villalpando [id number]         Thank you      Vascular Cardiology Service   [facility] - [id number]  Office [phone]  email:   [email or web address]

## 2019-06-28 NOTE — DISCHARGE NOTE NURSING/CASE MANAGEMENT/SOCIAL WORK - NSDCPEWEB_GEN_ALL_CORE
[place] website --- [email or web address] website --- [email or web address]

## 2019-06-28 NOTE — DISCHARGE NOTE NURSING/CASE MANAGEMENT/SOCIAL WORK - NSDCPEEMAIL_GEN_ALL_CORE
[facility] email [email or web address]

## 2019-07-08 PROBLEM — I70.1 ATHEROSCLEROSIS OF RENAL ARTERY: Chronic | Status: ACTIVE | Noted: 2019-06-27

## 2019-07-08 PROBLEM — I10 ESSENTIAL (PRIMARY) HYPERTENSION: Chronic | Status: ACTIVE | Noted: 2019-06-27

## 2019-07-08 PROBLEM — N18.9 CHRONIC KIDNEY DISEASE, UNSPECIFIED: Chronic | Status: ACTIVE | Noted: 2019-06-27

## 2019-07-08 PROBLEM — E78.5 HYPERLIPIDEMIA, UNSPECIFIED: Chronic | Status: ACTIVE | Noted: 2019-06-27

## 2019-07-08 PROBLEM — I71.4 ABDOMINAL AORTIC ANEURYSM, WITHOUT RUPTURE: Chronic | Status: ACTIVE | Noted: 2019-06-27

## 2019-07-09 ENCOUNTER — APPOINTMENT (OUTPATIENT)
Dept: CARDIOLOGY | Facility: CLINIC | Age: 69
End: 2019-07-09
Payer: MEDICARE

## 2019-07-09 PROCEDURE — 99214 OFFICE O/P EST MOD 30 MIN: CPT

## 2019-07-11 DIAGNOSIS — I10 ESSENTIAL (PRIMARY) HYPERTENSION: ICD-10-CM

## 2019-07-15 VITALS
SYSTOLIC BLOOD PRESSURE: 130 MMHG | DIASTOLIC BLOOD PRESSURE: 72 MMHG | RESPIRATION RATE: 16 BRPM | HEART RATE: 65 BPM | WEIGHT: 140 LBS | HEIGHT: 67 IN | BODY MASS INDEX: 21.97 KG/M2 | OXYGEN SATURATION: 99 %

## 2019-07-15 NOTE — HISTORY OF PRESENT ILLNESS
[FreeTextEntry1] : Followup visit 7/9/2019\par S/P renal artery stenting.  Had labwork checked with Dr. Payne with improvement in creatinine to 1.93 (was above 2 prior to intervention).  He is off metoprolol.  Hemoglobin was lower however on the repeat labs at 7.6 complains of dark stools and diarrhea (previously 8.8).  He has an apt to see GI next weds and an apt to see Dr. Payne for repeat lab work in 2 days.  He remains on aspirin and plavix.   The right groin has a small area of ecchymosis.  \par \par Overall he feels weak and feels he needs to recover.  Doesnt think he can return to work as a  yet.  ( I am concerned about his anemia and reported dark stools ) \par \par \par Followup visit 6/18/2019\par \par MRA abdomen/pelvis performed on 6/2/2019 - Diffuse atheromatous ectasia of the abdominal aorta.  Prominent plaque ulcerations without focal aneurysm.  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  \par \par Severe L renal artery stenosis.  \par \par He has stopped smoking.  here with his wife.  Called Dr. Barros office, most recent creatinine 1.57 in March 2019.  \par Complains of post prandial pains.  \par \par followup visit:  3/12/2019\par Still smoking\par Started aspirin 81 and Crestor 40mg \par Has not quit\par No CP or SOB.\par Most recent creatinine is 1.57\par No abdominal pain. No claudication. \par \par \par Amlodipine 10mg daily\par Metoprolol 25mg BID\par Aspirin 81\par Crestor 40\par \par CTA:  \par VESSELS: Atherosclerotic disease throughout the abdominal aorta and its \par branches with extensive mural thrombus. At the level of the renal arteries, \par the aorta measures up to 3.7 cm. There is moderate to severe ostial \par narrowing of the celiac artery. A proximal SMA thrombosed aneurysm measures \par up to 1.1 cm in transverse dimension with reconstitution in the mid segment. \par The inferior mesenteric artery is demonstrated high-grade stenosis/occlusion \par at its origin, with reconstitution in the proximal segment.

## 2019-07-15 NOTE — ASSESSMENT
[FreeTextEntry1] : Assessment:\par 1. AAA 3.5 cm\par 2.  Aortic Mural thrombus\par 3.  SMA stenosis\par 4.  L renal artery stenosis with decreased kidney size\par 5.  CKD\par 6.  heavy active smoker\par \par \par Plan:\par 1.  Continue aspirin and plavix. \par 2.  Continue with high intensity statin therapy\par 3.  Continue smoking cessation.\par 4.   Labwork to be checked again in 2 days, agree with GI eval.  \par 5.  In the future will need to discuss role for celiac artery intervention as he has post prandial pains and SMA /EZRA are occluded.\par 6.  Return in 3 months.  Care per Dr. Payne and Dr. Renee until then\par

## 2019-07-15 NOTE — REVIEW OF SYSTEMS
[Fever] : no fever [Feeling Fatigued] : feeling fatigued [Dyspnea on exertion] : not dyspnea during exertion [Chest Pain] : no chest pain [Lower Ext Edema] : no extremity edema [Leg Claudication] : intermittent leg claudication [Abdominal Pain] : abdominal pain [Change In The Stool] : change in stool [Negative] : Heme/Lymph

## 2019-10-01 NOTE — REVIEW OF SYSTEMS
all other ROS negative except as per HPI [Negative] : Heme/Lymph [Chest Pain] : no chest pain [Dyspnea on exertion] : not dyspnea during exertion [Lower Ext Edema] : no extremity edema [Leg Claudication] : no intermittent leg claudication [Abdominal Pain] : abdominal pain

## 2019-10-15 ENCOUNTER — APPOINTMENT (OUTPATIENT)
Dept: CARDIOLOGY | Facility: CLINIC | Age: 69
End: 2019-10-15
Payer: MEDICARE

## 2019-10-15 VITALS
WEIGHT: 149 LBS | OXYGEN SATURATION: 98 % | HEART RATE: 88 BPM | HEIGHT: 68 IN | BODY MASS INDEX: 22.58 KG/M2 | RESPIRATION RATE: 16 BRPM | DIASTOLIC BLOOD PRESSURE: 78 MMHG | SYSTOLIC BLOOD PRESSURE: 134 MMHG

## 2019-10-15 PROCEDURE — 99215 OFFICE O/P EST HI 40 MIN: CPT

## 2019-10-15 NOTE — PHYSICAL EXAM
[General Appearance - Well Developed] : well developed [Normal Appearance] : normal appearance [General Appearance - Well Nourished] : well nourished [No Deformities] : no deformities [Well Groomed] : well groomed [Eyelids - No Xanthelasma] : the eyelids demonstrated no xanthelasmas [General Appearance - In No Acute Distress] : no acute distress [Normal Conjunctiva] : the conjunctiva exhibited no abnormalities [No Oral Pallor] : no oral pallor [No Oral Cyanosis] : no oral cyanosis [Normal Oral Mucosa] : normal oral mucosa [No Jugular Venous Mckee A Waves] : no jugular venous mckee A waves [Normal Jugular Venous A Waves Present] : normal jugular venous A waves present [Normal Jugular Venous V Waves Present] : normal jugular venous V waves present [Respiration, Rhythm And Depth] : normal respiratory rhythm and effort [Auscultation Breath Sounds / Voice Sounds] : lungs were clear to auscultation bilaterally [Heart Rate And Rhythm] : heart rate and rhythm were normal [Exaggerated Use Of Accessory Muscles For Inspiration] : no accessory muscle use [Murmurs] : no murmurs present [Heart Sounds] : normal S1 and S2 [Abdomen Soft] : soft [Abdomen Tenderness] : non-tender [Abnormal Walk] : normal gait [Gait - Sufficient For Exercise Testing] : the gait was sufficient for exercise testing [Abdomen Mass (___ Cm)] : no abdominal mass palpated [Cyanosis, Localized] : no localized cyanosis [Nail Clubbing] : no clubbing of the fingernails [Petechial Hemorrhages (___cm)] : no petechial hemorrhages [] : no rash [Skin Color & Pigmentation] : normal skin color and pigmentation [No Venous Stasis] : no venous stasis [No Skin Ulcers] : no skin ulcer [No Xanthoma] : no  xanthoma was observed [Skin Lesions] : no skin lesions [Oriented To Time, Place, And Person] : oriented to person, place, and time [Affect] : the affect was normal [No Anxiety] : not feeling anxious [Mood] : the mood was normal

## 2019-10-15 NOTE — ASSESSMENT
[FreeTextEntry1] : Assessment:\par 1. AAA 3.5 cm\par 2.  Aortic Mural thrombus\par 3.  SMA stenosis\par 4.  L renal artery stenosis with decreased kidney size - s/p stent\par 5.  CKD\par 6.  heavy former smoker\par \par \par Plan:\par 1.  Continue aspirin and plavix. \par 2.  Continue with high intensity statin therapy\par 3.  Continue smoking cessation.\par 4.   Labwork to be checked with Dr. Payne next week.  \par 5.  SHARONDA/PVR and arterial duplex to assess for claudication. Instructions for walking given (45 min per day, 5 days per week. \par 6.  US aorta for surveillance and to eval for claudication\par 7.   Await #5 and 6\par 8. Return in 3 months.   \par

## 2019-10-15 NOTE — HISTORY OF PRESENT ILLNESS
[FreeTextEntry1] : Followup visit 10/15/2019\par no more smoking, using patch 14mg daily \par No more GI bleeding.\par Last creatinine was checked in august \par Urinating without issues.  \par weight has been ok.  If he eats large meals, he gets abdominal pains.  If he eats small frequent meals he can tolerate it.  \par Seeing Dr. Payne next Monday - will have labwork checked there.  \par His legs are bothering him.  If he walks quickley he is limited to bilateral calf burning.  \par Medications:\par Aspirin 81mg\par Rosuvastatin 40mg daily \par Clopidogrel 75mg daily \par Amlodipine 5mg daily \par Hydralazine 10mg BID\par Pantoprazole 40mg daily \par

## 2019-10-15 NOTE — REVIEW OF SYSTEMS
[Leg Claudication] : intermittent leg claudication [Abdominal Pain] : abdominal pain [Negative] : Heme/Lymph [Fever] : no fever [Feeling Fatigued] : not feeling fatigued [Dyspnea on exertion] : not dyspnea during exertion [Change In The Stool] : no change in stool [Chest Pain] : no chest pain [Lower Ext Edema] : no extremity edema

## 2020-01-14 ENCOUNTER — APPOINTMENT (OUTPATIENT)
Dept: CARDIOLOGY | Facility: CLINIC | Age: 70
End: 2020-01-14
Payer: MEDICARE

## 2020-01-14 VITALS
BODY MASS INDEX: 23.04 KG/M2 | DIASTOLIC BLOOD PRESSURE: 81 MMHG | SYSTOLIC BLOOD PRESSURE: 142 MMHG | HEART RATE: 64 BPM | RESPIRATION RATE: 16 BRPM | OXYGEN SATURATION: 96 % | HEIGHT: 68 IN | WEIGHT: 152 LBS

## 2020-01-14 PROCEDURE — 99215 OFFICE O/P EST HI 40 MIN: CPT

## 2020-01-14 NOTE — ASSESSMENT
[FreeTextEntry1] : Assessment:\par 1. AAA 3.5 cm - now 3.8cm\par 2.  Aortic Mural thrombus\par 3.  SMA stenosis\par 4.  L renal artery stenosis with decreased kidney size - s/p stent\par 5.  CKD\par 6.  heavy former smoker\par 7.  Severe lifestyle limiting darien 3 claudciation\par R SFA stenosis\par L CFA stenosis. \par \par \par Plan:\par 1.  Continue aspirin and Plavix. \par 2.  Continue with high intensity statin therapy\par 3.  Continue smoking cessation.\par 4.  He continues to have bilateral severe claudication despite OMT and walking.  I discussed role for peripheral angiogram and intervention with patient as well as his nephrologist Dr. Payne\par 5.  Plan for LE peripheral angio and intervention with Dr. Dunaway.  Will need to come in for IV fluid hydration prior to procedure.\par 6.  See me 2 weeks post procedure to discuss staging the other leg.

## 2020-01-14 NOTE — REVIEW OF SYSTEMS
[Fever] : no fever [Feeling Fatigued] : not feeling fatigued [Dyspnea on exertion] : not dyspnea during exertion [Chest Pain] : no chest pain [Lower Ext Edema] : no extremity edema [Leg Claudication] : intermittent leg claudication [Abdominal Pain] : abdominal pain [Change In The Stool] : no change in stool [Negative] : Heme/Lymph

## 2020-01-14 NOTE — PHYSICAL EXAM
[General Appearance - Well Developed] : well developed [Normal Appearance] : normal appearance [Well Groomed] : well groomed [General Appearance - Well Nourished] : well nourished [No Deformities] : no deformities [General Appearance - In No Acute Distress] : no acute distress [Normal Conjunctiva] : the conjunctiva exhibited no abnormalities [Eyelids - No Xanthelasma] : the eyelids demonstrated no xanthelasmas [No Oral Pallor] : no oral pallor [Normal Oral Mucosa] : normal oral mucosa [No Oral Cyanosis] : no oral cyanosis [Normal Jugular Venous A Waves Present] : normal jugular venous A waves present [Normal Jugular Venous V Waves Present] : normal jugular venous V waves present [No Jugular Venous Mckee A Waves] : no jugular venous mckee A waves [Exaggerated Use Of Accessory Muscles For Inspiration] : no accessory muscle use [Respiration, Rhythm And Depth] : normal respiratory rhythm and effort [Heart Rate And Rhythm] : heart rate and rhythm were normal [Auscultation Breath Sounds / Voice Sounds] : lungs were clear to auscultation bilaterally [Heart Sounds] : normal S1 and S2 [Murmurs] : no murmurs present [Abdomen Tenderness] : non-tender [Abdomen Soft] : soft [Abdomen Mass (___ Cm)] : no abdominal mass palpated [Abnormal Walk] : normal gait [Gait - Sufficient For Exercise Testing] : the gait was sufficient for exercise testing [Cyanosis, Localized] : no localized cyanosis [Nail Clubbing] : no clubbing of the fingernails [Petechial Hemorrhages (___cm)] : no petechial hemorrhages [FreeTextEntry1] : Non-palp pedal pulses.  [] : no rash [Skin Color & Pigmentation] : normal skin color and pigmentation [No Venous Stasis] : no venous stasis [Skin Lesions] : no skin lesions [No Skin Ulcers] : no skin ulcer [No Xanthoma] : no  xanthoma was observed [Oriented To Time, Place, And Person] : oriented to person, place, and time [Affect] : the affect was normal [Mood] : the mood was normal [No Anxiety] : not feeling anxious

## 2020-01-14 NOTE — HISTORY OF PRESENT ILLNESS
[FreeTextEntry1] : Followup visit 1/14/2020\par Not smoking\par Had vascular studies:\par SHARONDA Right 0.95 and left 0.96\par Arterial duplex showed:\par Right SFA severe stenosis\par Left CFA stenosis\par \par US aorta showed a stable AAA of 3.8cm\par \par Creatinine was checked 1/7/2020 with Dr. Payne 1.89 (down from 2.21 in November)\par \par He has been trying to walk daily as instructed for claudication but continues to have severe, lifestyle limiting claudication limited to 1 block.  both calves.  both legs both him equally.\par \par \par No CP or SOB.

## 2020-01-23 ENCOUNTER — INPATIENT (INPATIENT)
Facility: HOSPITAL | Age: 70
LOS: 0 days | Discharge: HOME | DRG: 253 | End: 2020-01-24
Attending: INTERNAL MEDICINE | Admitting: INTERNAL MEDICINE
Payer: MEDICARE

## 2020-01-23 VITALS
RESPIRATION RATE: 18 BRPM | TEMPERATURE: 98 F | HEART RATE: 58 BPM | HEIGHT: 68 IN | OXYGEN SATURATION: 100 % | WEIGHT: 151.9 LBS | SYSTOLIC BLOOD PRESSURE: 169 MMHG | DIASTOLIC BLOOD PRESSURE: 70 MMHG

## 2020-01-23 DIAGNOSIS — Z98.890 OTHER SPECIFIED POSTPROCEDURAL STATES: Chronic | ICD-10-CM

## 2020-01-23 DIAGNOSIS — I73.9 PERIPHERAL VASCULAR DISEASE, UNSPECIFIED: ICD-10-CM

## 2020-01-23 LAB
ALBUMIN SERPL ELPH-MCNC: 4.2 G/DL — SIGNIFICANT CHANGE UP (ref 3.3–5)
ALP SERPL-CCNC: 72 U/L — SIGNIFICANT CHANGE UP (ref 40–120)
ALT FLD-CCNC: 13 U/L — SIGNIFICANT CHANGE UP (ref 10–45)
ANION GAP SERPL CALC-SCNC: 13 MMOL/L — SIGNIFICANT CHANGE UP (ref 5–17)
AST SERPL-CCNC: 21 U/L — SIGNIFICANT CHANGE UP (ref 10–40)
BILIRUB SERPL-MCNC: 0.5 MG/DL — SIGNIFICANT CHANGE UP (ref 0.2–1.2)
BUN SERPL-MCNC: 35 MG/DL — HIGH (ref 7–23)
CALCIUM SERPL-MCNC: 9.6 MG/DL — SIGNIFICANT CHANGE UP (ref 8.4–10.5)
CHLORIDE SERPL-SCNC: 101 MMOL/L — SIGNIFICANT CHANGE UP (ref 96–108)
CO2 SERPL-SCNC: 26 MMOL/L — SIGNIFICANT CHANGE UP (ref 22–31)
CREAT SERPL-MCNC: 2.25 MG/DL — HIGH (ref 0.5–1.3)
GLUCOSE SERPL-MCNC: 97 MG/DL — SIGNIFICANT CHANGE UP (ref 70–99)
HCT VFR BLD CALC: 31.6 % — LOW (ref 39–50)
HGB BLD-MCNC: 9.8 G/DL — LOW (ref 13–17)
MCHC RBC-ENTMCNC: 29.7 PG — SIGNIFICANT CHANGE UP (ref 27–34)
MCHC RBC-ENTMCNC: 31 GM/DL — LOW (ref 32–36)
MCV RBC AUTO: 95.8 FL — SIGNIFICANT CHANGE UP (ref 80–100)
NRBC # BLD: 0 /100 WBCS — SIGNIFICANT CHANGE UP (ref 0–0)
PLATELET # BLD AUTO: 169 K/UL — SIGNIFICANT CHANGE UP (ref 150–400)
POTASSIUM SERPL-MCNC: 3.9 MMOL/L — SIGNIFICANT CHANGE UP (ref 3.5–5.3)
POTASSIUM SERPL-SCNC: 3.9 MMOL/L — SIGNIFICANT CHANGE UP (ref 3.5–5.3)
PROT SERPL-MCNC: 7.3 G/DL — SIGNIFICANT CHANGE UP (ref 6–8.3)
RBC # BLD: 3.3 M/UL — LOW (ref 4.2–5.8)
RBC # FLD: 13.8 % — SIGNIFICANT CHANGE UP (ref 10.3–14.5)
SODIUM SERPL-SCNC: 140 MMOL/L — SIGNIFICANT CHANGE UP (ref 135–145)
WBC # BLD: 8.41 K/UL — SIGNIFICANT CHANGE UP (ref 3.8–10.5)
WBC # FLD AUTO: 8.41 K/UL — SIGNIFICANT CHANGE UP (ref 3.8–10.5)

## 2020-01-23 PROCEDURE — 75710 ARTERY X-RAYS ARM/LEG: CPT | Mod: 26,XU

## 2020-01-23 PROCEDURE — 93010 ELECTROCARDIOGRAM REPORT: CPT

## 2020-01-23 PROCEDURE — 99221 1ST HOSP IP/OBS SF/LOW 40: CPT

## 2020-01-23 PROCEDURE — 37224: CPT

## 2020-01-23 RX ORDER — ASPIRIN/CALCIUM CARB/MAGNESIUM 324 MG
1 TABLET ORAL
Qty: 0 | Refills: 0 | DISCHARGE

## 2020-01-23 RX ORDER — PANTOPRAZOLE SODIUM 20 MG/1
1 TABLET, DELAYED RELEASE ORAL
Qty: 0 | Refills: 0 | DISCHARGE

## 2020-01-23 RX ORDER — PANTOPRAZOLE SODIUM 20 MG/1
40 TABLET, DELAYED RELEASE ORAL
Refills: 0 | Status: DISCONTINUED | OUTPATIENT
Start: 2020-01-23 | End: 2020-01-24

## 2020-01-23 RX ORDER — AMLODIPINE BESYLATE 2.5 MG/1
5 TABLET ORAL DAILY
Refills: 0 | Status: DISCONTINUED | OUTPATIENT
Start: 2020-01-23 | End: 2020-01-24

## 2020-01-23 RX ORDER — ASPIRIN/CALCIUM CARB/MAGNESIUM 324 MG
81 TABLET ORAL DAILY
Refills: 0 | Status: DISCONTINUED | OUTPATIENT
Start: 2020-01-23 | End: 2020-01-24

## 2020-01-23 RX ORDER — HYDRALAZINE HCL 50 MG
10 TABLET ORAL THREE TIMES A DAY
Refills: 0 | Status: DISCONTINUED | OUTPATIENT
Start: 2020-01-23 | End: 2020-01-24

## 2020-01-23 RX ORDER — SODIUM CHLORIDE 9 MG/ML
3 INJECTION INTRAMUSCULAR; INTRAVENOUS; SUBCUTANEOUS EVERY 8 HOURS
Refills: 0 | Status: DISCONTINUED | OUTPATIENT
Start: 2020-01-23 | End: 2020-01-24

## 2020-01-23 RX ORDER — SODIUM CHLORIDE 9 MG/ML
1000 INJECTION INTRAMUSCULAR; INTRAVENOUS; SUBCUTANEOUS
Refills: 0 | Status: DISCONTINUED | OUTPATIENT
Start: 2020-01-23 | End: 2020-01-24

## 2020-01-23 RX ORDER — ATORVASTATIN CALCIUM 80 MG/1
80 TABLET, FILM COATED ORAL AT BEDTIME
Refills: 0 | Status: DISCONTINUED | OUTPATIENT
Start: 2020-01-23 | End: 2020-01-24

## 2020-01-23 RX ORDER — AMLODIPINE BESYLATE 2.5 MG/1
1 TABLET ORAL
Qty: 0 | Refills: 0 | DISCHARGE

## 2020-01-23 RX ORDER — ROSUVASTATIN CALCIUM 5 MG/1
1 TABLET ORAL
Qty: 0 | Refills: 0 | DISCHARGE

## 2020-01-23 RX ORDER — CLOPIDOGREL BISULFATE 75 MG/1
75 TABLET, FILM COATED ORAL DAILY
Refills: 0 | Status: DISCONTINUED | OUTPATIENT
Start: 2020-01-23 | End: 2020-01-24

## 2020-01-23 RX ADMIN — SODIUM CHLORIDE 75 MILLILITER(S): 9 INJECTION INTRAMUSCULAR; INTRAVENOUS; SUBCUTANEOUS at 12:30

## 2020-01-23 RX ADMIN — Medication 10 MILLIGRAM(S): at 21:05

## 2020-01-23 RX ADMIN — Medication 10 MILLIGRAM(S): at 14:03

## 2020-01-23 RX ADMIN — SODIUM CHLORIDE 3 MILLILITER(S): 9 INJECTION INTRAMUSCULAR; INTRAVENOUS; SUBCUTANEOUS at 21:09

## 2020-01-23 RX ADMIN — SODIUM CHLORIDE 75 MILLILITER(S): 9 INJECTION INTRAMUSCULAR; INTRAVENOUS; SUBCUTANEOUS at 21:05

## 2020-01-23 RX ADMIN — SODIUM CHLORIDE 3 MILLILITER(S): 9 INJECTION INTRAMUSCULAR; INTRAVENOUS; SUBCUTANEOUS at 12:53

## 2020-01-23 NOTE — H&P CARDIOLOGY - HISTORY OF PRESENT ILLNESS
68 y/o Male no implantable device with HTN, HLD, CKD 4 (BUN/Cr 29/2.2, GFR 29-30 on 6/2019), renal insufficiency s/p left renal artery stent (6/2019), former smoker, aortic aneurysm presents for peripheral angiogram with possible intervention. Pt reports he get bilateral LE claudication (not able to walk more 2-3 blocks without pain) for about a year, evaluated by Dr. Villalpando and referred for cath.     MRA abdomen/pelvis performed on 6/2/2019 - Diffuse atheromatous ectasia of the abdominal aorta. Prominent plaque ulcerations without focal aneurysm. 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 Severe L renal artery stenosis.   Card: Dr. Renee  Renal: Dr. Payne    10/22/2019: SHARONDA RT 0.95, left 0.96, TBI right 0.46, left 0.53  borderline bilateral large vessel PAD, possible distal small vessel disease bilaterally  MR Angio chest from June 2019:  1.  No aortic aneurysm.  2.  Extensive atherosclerosis of the descending aorta. Several small penetrating ulcers through the course of the descending aorta some of which are more conspicuous compared to prior chest CT which can be secondary to different technique.  MR Angio of Abdomen: IMPRESSION: Diffuse atheromatous ectasia of the abdominal aorta with prominent plaque ulcerations but without focal aneurysm. Severe mesenteric arterial disease, with the mesenteric circulation supplied by a stenotic celiac artery. Proximal occlusions of the SMA and EZRA. Unchanged since CT of 3/5/2019. Severe left renal artery stenosis    MR Angio of pelvis: IMPRESSION:   Diffuse atheromatous ectasia of the abdominal aorta with prominent plaque ulcerations but without focal aneurysm.  Severe mesenteric arterial disease, with the mesenteric circulation supplied by a stenotic celiac artery. Proximal occlusions of the SMA and EZRA. Unchanged since CT of 3/5/2019.Severe left renal artery stenosis.

## 2020-01-23 NOTE — PATIENT PROFILE ADULT - NSTOBACCONEVERSMOKERY/N_GEN_A
Patient is a 74y old  Male who presents with a chief complaint of Weakness and sweating (27 Sep 2018 13:00)      Subjective:      Vital Signs Last 24 Hrs  T(C): 37.4 (28 Sep 2018 05:43), Max: 37.6 (27 Sep 2018 07:20)  T(F): 99.3 (28 Sep 2018 05:43), Max: 99.6 (27 Sep 2018 07:20)  HR: 80 (28 Sep 2018 05:43) (78 - 89)  BP: 129/60 (28 Sep 2018 05:43) (129/60 - 167/75)  BP(mean): --  RR: 18 (28 Sep 2018 05:43) (18 - 18)  SpO2: 100% (27 Sep 2018 15:49) (100% - 100%)    PHYSICAL EXAM  General: adult in NAD  HEENT: clear oropharynx, anicteric sclera, pink conjunctiva  Neck: supple  CV: normal S1/S2 with no murmur rubs or gallops  Lungs: positive air movement b/l ant lungs,clear to auscultation, no wheezes, no rales  Abdomen: soft non-tender non-distended, no hepatosplenomegaly  Ext: no clubbing cyanosis or edema  Skin: no rashes and no petechiae  Neuro: alert and oriented X 4, no focal deficits    MEDICATIONS  (STANDING):  aspirin  chewable 81 milliGRAM(s) Oral daily  atorvastatin Oral Tab/Cap - Peds 10 milliGRAM(s) Oral daily  docusate sodium 100 milliGRAM(s) Oral three times a day  finasteride 5 milliGRAM(s) Oral daily  heparin  Injectable 5000 Unit(s) SubCutaneous every 8 hours  lactulose Syrup 10 Gram(s) Oral three times a day  meropenem  IVPB      meropenem  IVPB 1000 milliGRAM(s) IV Intermittent every 12 hours  metoprolol succinate ER 50 milliGRAM(s) Oral daily  NIFEdipine XL 90 milliGRAM(s) Oral daily  oxyCODONE    IR 10 milliGRAM(s) Oral every 6 hours  pantoprazole    Tablet 40 milliGRAM(s) Oral before breakfast  polyethylene glycol 3350 17 Gram(s) Oral two times a day  senna 8.6 milliGRAM(s) Oral Tablet - Peds 1 Tablet(s) Oral at bedtime  sodium chloride 0.9%. 1000 milliLiter(s) (75 mL/Hr) IV Continuous <Continuous>  tamsulosin 0.4 milliGRAM(s) Oral at bedtime  traZODone 50 milliGRAM(s) Oral daily  vancomycin  IVPB 1000 milliGRAM(s) IV Intermittent daily    MEDICATIONS  (PRN):      LABS:                          7.5    21.04 )-----------( 449      ( 27 Sep 2018 16:51 )             22.2         Mean Cell Volume : 81.9 fL  Mean Cell Hemoglobin : 27.7 pg  Mean Cell Hemoglobin Concentration : 33.8 g/dL  Auto Neutrophil # : 16.43 K/uL  Auto Lymphocyte # : 0.85 K/uL  Auto Monocyte # : 3.46 K/uL  Auto Eosinophil # : 0.01 K/uL  Auto Basophil # : 0.06 K/uL  Auto Neutrophil % : 78.2 %  Auto Lymphocyte % : 4.0 %  Auto Monocyte % : 16.4 %  Auto Eosinophil % : 0.0 %  Auto Basophil % : 0.3 %      Serial CBC's   @ 16:51  Hct-22.2 / Hgb-7.5 / Plat-449 / RBC-2.71 / WBC-21.04  Serial CBC's   @ 20:45  Hct-21.8 / Hgb-7.5 / Plat-466 / RBC-2.70 / WBC-22.01          139  |  103  |  28<H>  ----------------------------<  122<H>  4.8   |  19  |  1.9<H>    Ca    8.4<L>      27 Sep 2018 16:51    TPro  6.3  /  Alb  3.1<L>  /  TBili  0.8  /  DBili  0.5<H>  /  AST  124<H>  /  ALT  58<H>  /  AlkPhos  747<H>        PT/INR - ( 27 Sep 2018 16:51 )   PT: 14.50 sec;   INR: 1.35 ratio                         Urinalysis Basic - ( 26 Sep 2018 20:45 )    Color: Yellow / Appearance: Cloudy / S.010 / pH: x  Gluc: x / Ketone: Negative  / Bili: Negative / Urobili: 0.2 mg/dL   Blood: x / Protein: 30 mg/dL / Nitrite: Negative   Leuk Esterase: Negative / RBC: x / WBC x   Sq Epi: x / Non Sq Epi: Occasional /HPF / Bacteria: x      Culture - Urine (collected 26 Sep 2018 20:45)  Source: .Urine Clean Catch (Midstream)  Final Report (28 Sep 2018 05:37):    No growth    Culture - Blood (collected 26 Sep 2018 20:45)  Source: .Blood Blood  Preliminary Report (28 Sep 2018 03:01):    No growth to date.    Culture - Blood (collected 26 Sep 2018 20:45)  Source: .Blood Blood  Preliminary Report (28 Sep 2018 03:01):    No growth to date. Patient is a 74y old  Male who presents with a chief complaint of Weakness and sweating (27 Sep 2018 13:00)    Subjective: Patient seen and examined.  Reports that he feels much better today than yesterday.  Was able to have a bowel movement.  Is denying any current complaints.    Vital Signs Last 24 Hrs  T(C): 37.4 (28 Sep 2018 05:43), Max: 37.6 (27 Sep 2018 07:20)  T(F): 99.3 (28 Sep 2018 05:43), Max: 99.6 (27 Sep 2018 07:20)  HR: 80 (28 Sep 2018 05:43) (78 - 89)  BP: 129/60 (28 Sep 2018 05:43) (129/60 - 167/75)  BP(mean): --  RR: 18 (28 Sep 2018 05:43) (18 - 18)  SpO2: 100% (27 Sep 2018 15:49) (100% - 100%)    PHYSICAL EXAM  General: adult in NAD, sitting comfortably in bed  HEENT: NC/AT  Neck: supple  CV: +S1, +S2  Lungs: positive air movement b/l ant lungs, clear to auscultation, no wheezing  Abdomen: distended but soft, non-tender to palpation, palpable RUQ mass  Ext: no edema  Skin: no rashes and no petechiae, +chemoport on left chest. Site appears clean and dry.  Neuro: alert and oriented X 4, no focal deficits    MEDICATIONS  (STANDING):  aspirin  chewable 81 milliGRAM(s) Oral daily  atorvastatin Oral Tab/Cap - Peds 10 milliGRAM(s) Oral daily  docusate sodium 100 milliGRAM(s) Oral three times a day  finasteride 5 milliGRAM(s) Oral daily  heparin  Injectable 5000 Unit(s) SubCutaneous every 8 hours  lactulose Syrup 10 Gram(s) Oral three times a day  meropenem  IVPB      meropenem  IVPB 1000 milliGRAM(s) IV Intermittent every 12 hours  metoprolol succinate ER 50 milliGRAM(s) Oral daily  NIFEdipine XL 90 milliGRAM(s) Oral daily  oxyCODONE    IR 10 milliGRAM(s) Oral every 6 hours  pantoprazole    Tablet 40 milliGRAM(s) Oral before breakfast  polyethylene glycol 3350 17 Gram(s) Oral two times a day  senna 8.6 milliGRAM(s) Oral Tablet - Peds 1 Tablet(s) Oral at bedtime  sodium chloride 0.9%. 1000 milliLiter(s) (75 mL/Hr) IV Continuous <Continuous>  tamsulosin 0.4 milliGRAM(s) Oral at bedtime  traZODone 50 milliGRAM(s) Oral daily  vancomycin  IVPB 1000 milliGRAM(s) IV Intermittent daily    MEDICATIONS  (PRN):      LABS:                          7.5    21.04 )-----------( 449      ( 27 Sep 2018 16:51 )             22.2         Mean Cell Volume : 81.9 fL  Mean Cell Hemoglobin : 27.7 pg  Mean Cell Hemoglobin Concentration : 33.8 g/dL  Auto Neutrophil # : 16.43 K/uL  Auto Lymphocyte # : 0.85 K/uL  Auto Monocyte # : 3.46 K/uL  Auto Eosinophil # : 0.01 K/uL  Auto Basophil # : 0.06 K/uL  Auto Neutrophil % : 78.2 %  Auto Lymphocyte % : 4.0 %  Auto Monocyte % : 16.4 %  Auto Eosinophil % : 0.0 %  Auto Basophil % : 0.3 %      Serial CBC's   @ 16:51  Hct-22.2 / Hgb-7.5 / Plat-449 / RBC-2.71 / WBC-21.04  Serial CBC's   @ 20:45  Hct-21.8 / Hgb-7.5 / Plat-466 / RBC-2.70 / WBC-22.01          139  |  103  |  28<H>  ----------------------------<  122<H>  4.8   |  19  |  1.9<H>    Ca    8.4<L>      27 Sep 2018 16:51    TPro  6.3  /  Alb  3.1<L>  /  TBili  0.8  /  DBili  0.5<H>  /  AST  124<H>  /  ALT  58<H>  /  AlkPhos  747<H>        PT/INR - ( 27 Sep 2018 16:51 )   PT: 14.50 sec;   INR: 1.35 ratio                         Urinalysis Basic - ( 26 Sep 2018 20:45 )    Color: Yellow / Appearance: Cloudy / S.010 / pH: x  Gluc: x / Ketone: Negative  / Bili: Negative / Urobili: 0.2 mg/dL   Blood: x / Protein: 30 mg/dL / Nitrite: Negative   Leuk Esterase: Negative / RBC: x / WBC x   Sq Epi: x / Non Sq Epi: Occasional /HPF / Bacteria: x      Culture - Urine (collected 26 Sep 2018 20:45)  Source: .Urine Clean Catch (Midstream)  Final Report (28 Sep 2018 05:37):    No growth    Culture - Blood (collected 26 Sep 2018 20:45)  Source: .Blood Blood  Preliminary Report (28 Sep 2018 03:01):    No growth to date.    Culture - Blood (collected 26 Sep 2018 20:45)  Source: .Blood Blood  Preliminary Report (28 Sep 2018 03:01):    No growth to date. Yes

## 2020-01-23 NOTE — H&P CARDIOLOGY - PMH
AAA (abdominal aortic aneurysm)  3.5 CM  CKD (chronic kidney disease)    CKD (chronic kidney disease) stage 4, GFR 15-29 ml/min    Claudication in peripheral vascular disease    Former smoker    HLD (hyperlipidemia)    HTN (hypertension)    Renal artery stenosis

## 2020-01-23 NOTE — CHART NOTE - NSCHARTNOTEFT_GEN_A_CORE
FEMORAL PERCLOSE ASSESSMENT NOTE    Left femoral Perclose in place with good hemostatis w/o any bleeding or hematoma  Pulses in the left lower extremity are palpablE, (left femoral and right pedal pulses +doppler).  Leftgroin is soft/non tender  Patient denies leg/s pain or foot pain or chest pain    Complications: None    Comments: patient is to remain bed rest for the next 6 hours. IVF until AM DAPT

## 2020-01-23 NOTE — PATIENT PROFILE ADULT - NSPROMEDSADMININFO_GEN_A_NUR
Chief Complaint- joint pain     Subjective:   Buster Medina is a 70 y.o. male here today for follow up of rheumatological issues    This is a follow-up visit for this patient who is seen in this clinic for psoriatic arthritis.  Patient was diagnosed with psoriatic arthritis about 2008 by rheumatology in [place] with predominant manifestations of psoriasis.  Patient is currently on Humira 40 mg subcu every 2 to 3 weeks and at last visit we added Plaquenil 200 mg p.o. twice daily because patient complained of exacerbation of psoriasis as well as exacerbation of joint pain.  Patient denies any side effects from the Plaquenil, states his psoriasis has improved but continues to have problems especially in his left mid foot.  However patient denies any sausage digits no dactylitis no enthesitis no Achilles inflammation.      Patient recently status post evaluation by Dr. Block at [facility] for DJD in left foot, was told that surgical option would be fusion of the bones in his left foot for which patient wants to postpone.     Co morbidities include HTN, high cholesterol, hx left ahilles tendon rupture and repair, hx left knee arthroscopic surgery for meniscal tear, s/p left rotator cuff tear, s/p bladder cancer about 2012 no recurrence s/p surgical intervention only, hx of gastric bypass 2005 [facility] [facility], pt does f/u with Dr Gamez q year     Right TSA     S/p topical treatments  S/p humira-lost efficacy       G6PD 10.9 adequate 9/2019  HBsAg/HBcAbIgM neg 8/2018  HCV neg 8/2018  Quantiferon Gold neg 8/2018  CCP neg 3/2018  RF neg 3/2018  SEAN neg 3/2018  Hand x-rays 3/2018-indicates possible erosion of the fourth metacarpal on the left hand, 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     • Apremilast  (OTEZLA) 10 & 20 & 30 MG Tablet Therapy Pack 10 mg po qday for one day then 10 mg po bid for one day then 20 mg po qam then 10 mg po qpm for one day then 20 mg po bid for one day then 30 mg po qam and 20 mg po q pm for one day then 30 mg po bid 21 Each 0   • Apremilast (OTEZLA) 30 MG Tab 1 tab po bid after finishing the loading dose 180 Tab 1   • valacyclovir (VALTREX) 1 GM Tab TAKE 1 TABLET TWICE A DAY  FOR 7 DAYS AS NEEDED FOR   OUTBREAK 24 Tab 0   • lisinopril (PRINIVIL) 20 MG Tab TAKE 1 TABLET BY MOUTH EVERY DAY IN THE EVENING 90 Tab 0   • Adalimumab 40 MG/0.4ML Pen-injector Kit Inject 40 mg as instructed every 14 days. 6 Each 1   • hydroxychloroquine (PLAQUENIL) 200 MG Tab 1 tab po bid 180 Tab 1   • omeprazole (PRILOSEC) 20 MG delayed-release capsule Take 1 Cap by mouth every day. 30 Cap 3   • atorvastatin (LIPITOR) 40 MG Tab Take 1 Tab by mouth every day. 90 Tab 3   • celecoxib (CELEBREX) 200 MG Cap TAKE 1 CAPSULE DAILY 90 Cap 2   • mirtazapine (REMERON) 15 MG Tab TAKE 1/2 TABLET AT BEDTIME 45 Tab 0   • mirtazapine (REMERON) 15 MG Tab 7.5 mg.     • fluticasone (FLOVENT HFA) 44 MCG/ACT Aerosol 2 Puffs by Nebulization route.     • ferrous sulfate 325 (65 Fe) MG tablet Take  by mouth.     • aspirin EC (ECOTRIN) 81 MG Tablet Delayed Response Take 81 mg by mouth every bedtime. 30 Tab 0   • Multiple Vitamins-Minerals (MULTI COMPLETE PO) Take 1 Tab by mouth 2 Times a Day.     • Multiple Vitamins-Minerals (MELODY-DAY 1000 PO) Take 1 Tab by mouth 2 Times a Day.     • Cyanocobalamin (B-12) 2500 MCG Tab Take 1 Tab by mouth every morning.     • predniSONE (DELTASONE) 20 MG Tab Take 3 tabs po for 2 days then 2 tabs for 2 days then 1 for 2 days (Patient not taking: Reported on 12/2/2019) 12 Tab 0   • potassium chloride SA (KLOR-CON M10) 10 MEQ Tab CR Take 1 Tab by mouth every day. (Patient not taking: Reported on 12/2/2019) 90 Tab 0     No current facility-administered medications for this visit.      He  has a past medical  history of Anxiety, Arthritis, Cancer (HCC) (2007), Chickenpox, Depression, High cholesterol, Hyperlipidemia, Hypertension, Kidney stone, Mumps, Obesity, Pneumothorax, Restless leg syndrome, Sleep apnea, and Tonsillitis.    ROS   Other than what is mentioned in HPI or physical exam, there is no history of headaches, double vision or blurred vision. No temporal tenderness or jaw claudication. No trouble swallowing difficulties or sore throats.  No chest complaints including chest pain, cough or sputum production. No GI complaints including nausea, vomiting, change in bowel habits, or past peptic ulcer disease. No history of blood in the stools. No urinary complaints including dysuria or frequency. No history of alopecia, photosensitivity, oral ulcerations, Raynaud's phenomena.       Objective:     BP (!) 172/98   Pulse 84   Temp 36.2 °C (97.2 °F) (Temporal)   Resp 14   Wt 85.7 kg (189 lb)   SpO2 97%  Body mass index is 29.6 kg/m².   Physical Exam:    Constitutional: Alert and oriented X3, patient is talkative with good eye contact.Skin: Warm, dry, good turgor, no rashes in visible areas.Eye: Equal, round and reactive, conjunctiva clear, lids normal EOM intactENMT: Lips without lesions, good dentition, no oropharyngeal ulcers, moist buccal mucosa, pinna without deformityNeck: Trachea midline, no masses, no thyromegaly.Lymph:  No cervical lymphadenopathy, no axillary lymphadenopathy, no supraclavicular lymphadenopathyRespiratory: Unlabored respiratory effort, lungs clear to auscultation, no wheezes, no ronchi.Cardiovascular: Normal S1, S2, no murmur, no edema.Abdomen: Soft, non-tender, no masses, no hepatosplenomegaly.Psych: Alert and oriented x3, normal affect and mood.Neuro: Cranial nerves 2-12 are grossly intact, no loss of sensation LEExt:no joint laxity noted in bilateral arms, no joint laxity noted in bilateral legs, no crossover toes no splay toes no deformities of the hands, there is some mild tenderness  palpation along the left great toe as pictured                Lab Results   Component Value Date/Time    QNTTBGOLD Negative 08/16/2018 12:13 PM     Lab Results   Component Value Date/Time    HEPBCORIGM Negative 08/16/2018 12:13 PM    HEPBSAG Negative 08/16/2018 12:13 PM     Lab Results   Component Value Date/Time    HEPCAB Negative 08/16/2018 12:13 PM     Lab Results   Component Value Date/Time    SODIUM RR 09/26/2019 10:06 AM    SODIUM 142 09/26/2019 10:06 AM    POTASSIUM RR 09/26/2019 10:06 AM    POTASSIUM 5.0 09/26/2019 10:06 AM    CHLORIDE RR 09/26/2019 10:06 AM    CHLORIDE 108 09/26/2019 10:06 AM    CO2 RR 09/26/2019 10:06 AM    CO2 24 09/26/2019 10:06 AM    GLUCOSE RR 09/26/2019 10:06 AM    GLUCOSE 100 (H) 09/26/2019 10:06 AM    BUN RR 09/26/2019 10:06 AM    BUN 18 09/26/2019 10:06 AM    CREATININE RR 09/26/2019 10:06 AM    CREATININE 1.17 09/26/2019 10:06 AM      Lab Results   Component Value Date/Time    WBC 5.0 09/26/2019 10:06 AM    RBC 4.63 (L) 09/26/2019 10:06 AM    HEMOGLOBIN 15.1 09/26/2019 10:06 AM    HEMATOCRIT 45.8 09/26/2019 10:06 AM    MCV 98.9 (H) 09/26/2019 10:06 AM    MCH 32.6 09/26/2019 10:06 AM    MCHC 33.0 (L) 09/26/2019 10:06 AM    MPV 12.3 09/26/2019 10:06 AM    NEUTSPOLYS 43.00 (L) 09/26/2019 10:06 AM    LYMPHOCYTES 41.10 (H) 09/26/2019 10:06 AM    MONOCYTES 11.30 09/26/2019 10:06 AM    EOSINOPHILS 3.40 09/26/2019 10:06 AM    BASOPHILS 1.00 09/26/2019 10:06 AM      Lab Results   Component Value Date/Time    CALCIUM RR 09/26/2019 10:06 AM    CALCIUM 9.7 09/26/2019 10:06 AM    ASTSGOT RR 09/26/2019 10:06 AM    ASTSGOT 29 09/26/2019 10:06 AM    ALTSGPT RR 09/26/2019 10:06 AM    ALTSGPT 35 09/26/2019 10:06 AM    ALKPHOSPHAT RR 09/26/2019 10:06 AM    ALKPHOSPHAT 58 09/26/2019 10:06 AM    TBILIRUBIN RR 09/26/2019 10:06 AM    TBILIRUBIN 0.5 09/26/2019 10:06 AM    ALBUMIN RR 09/26/2019 10:06 AM    ALBUMIN 4.7 09/26/2019 10:06 AM    TOTPROTEIN RR 09/26/2019 10:06 AM    TOTPROTEIN 6.8 09/26/2019  10:06 AM     Lab Results   Component Value Date/Time    URICACID 8.5 (H) 08/16/2018 12:13 PM    RHEUMFACTN <10 03/21/2018 02:19 PM    CCPANTIBODY 4 03/21/2018 02:20 PM    ANTINUCAB None Detected 03/21/2018 02:19 PM     Lab Results   Component Value Date/Time    SEDRATEWES 4 09/17/2019 10:30 AM     Lab Results   Component Value Date/Time    G6PD 10.9 09/17/2019 10:30 AM     Results for orders placed during the hospital encounter of 03/20/18   DX-FOOT-COMPLETE 3+ LEFT    Impression 1.  There is no radiographic evidence of inflammatory arthropathy.  2.  There is mild degenerative change at the 1st tarsometatarsal joint and tibiotalar joint.  3.  Question of subchondral cyst versus early erosive change aat the base of the 1st proximal phalanx.     Results for orders placed during the hospital encounter of 03/20/18   DX-HAND 3+ LEFT    Impression 1.  There is a questionable erosion versus subchondral cyst on the ulnar aspect of the base of the left 4th metacarpal.  2.  There is postoperative change and degenerative change involving the 1st and 2nd carpometacarpal junctions. There is a question of previous trapezium resection.      Assessment and Plan:     1. Psoriatic arthritis (HCC)  Psoriasis stable, currently on Humira 40 mg subcu every 2 weeks, at last visit we did discuss alternative Biologics and patient is interested in trying alternative biologic such as Otezla.  Risks of Otezla reviewed with patient patient states understanding including mood disorders.  - Apremilast (OTEZLA) 10 & 20 & 30 MG Tablet Therapy Pack; 10 mg po qday for one day then 10 mg po bid for one day then 20 mg po qam then 10 mg po qpm for one day then 20 mg po bid for one day then 30 mg po qam and 20 mg po q pm for one day then 30 mg po bid  Dispense: 21 Each; Refill: 0  - Apremilast (OTEZLA) 30 MG Tab; 1 tab po bid after finishing the loading dose  Dispense: 180 Tab; Refill: 1    2. Long-term current use of apremilast  Start Otezla 10 mg taper  up to 30 mg p.o. twice daily  Of note screening labs are up-to-date, will do monitoring labs but every 6 months we will follow patient up again in about 10 weeks at that time possibly do some repeat monitoring labs.    3. Long-term use of Plaquenil  On Plaquenil 200 mg p.o. twice daily, of note G6PD levels are adequate, next visit will see if we need to continue the Plaquenil if we do then we will schedule patient ophthalmology evaluation.    4. Essential hypertension  May impact the type of medications we can use for this patient's arthritis. We will have to keep this under advisement.    5. H/O gastric bypass  Followed by Dr Gamez q year    Followup: Return in about 10 weeks (around 2/10/2020). or sooner ankush Medina  was seen 30 minutes face-to-face of which more than 50% of the time was spent counseling the patient (excluding time for procedures)  regarding  rheumatological condition and care. Therapy was discussed in detail.      Please note that this dictation was created using voice recognition software. I have made every reasonable attempt to correct obvious errors, but I expect that there are errors of grammar and possibly content that I did not discover before finalizing the note.                no concerns

## 2020-01-23 NOTE — H&P CARDIOLOGY - FAMILY HISTORY
Mother  Still living? No  Family history of cancer, Age at diagnosis: Age Unknown     Father  Still living? No  Family history of brain aneurysm, Age at diagnosis: Age Unknown

## 2020-01-24 ENCOUNTER — TRANSCRIPTION ENCOUNTER (OUTPATIENT)
Age: 70
End: 2020-01-24

## 2020-01-24 VITALS
TEMPERATURE: 98 F | SYSTOLIC BLOOD PRESSURE: 146 MMHG | DIASTOLIC BLOOD PRESSURE: 62 MMHG | OXYGEN SATURATION: 98 % | HEART RATE: 50 BPM | RESPIRATION RATE: 17 BRPM

## 2020-01-24 LAB
ANION GAP SERPL CALC-SCNC: 13 MMOL/L — SIGNIFICANT CHANGE UP (ref 5–17)
BUN SERPL-MCNC: 28 MG/DL — HIGH (ref 7–23)
CALCIUM SERPL-MCNC: 9.4 MG/DL — SIGNIFICANT CHANGE UP (ref 8.4–10.5)
CHLORIDE SERPL-SCNC: 103 MMOL/L — SIGNIFICANT CHANGE UP (ref 96–108)
CO2 SERPL-SCNC: 24 MMOL/L — SIGNIFICANT CHANGE UP (ref 22–31)
CREAT SERPL-MCNC: 2 MG/DL — HIGH (ref 0.5–1.3)
GLUCOSE SERPL-MCNC: 97 MG/DL — SIGNIFICANT CHANGE UP (ref 70–99)
HCT VFR BLD CALC: 30.6 % — LOW (ref 39–50)
HGB BLD-MCNC: 9.5 G/DL — LOW (ref 13–17)
MCHC RBC-ENTMCNC: 29.9 PG — SIGNIFICANT CHANGE UP (ref 27–34)
MCHC RBC-ENTMCNC: 31 GM/DL — LOW (ref 32–36)
MCV RBC AUTO: 96.2 FL — SIGNIFICANT CHANGE UP (ref 80–100)
NRBC # BLD: 0 /100 WBCS — SIGNIFICANT CHANGE UP (ref 0–0)
PLATELET # BLD AUTO: 157 K/UL — SIGNIFICANT CHANGE UP (ref 150–400)
POTASSIUM SERPL-MCNC: 4.4 MMOL/L — SIGNIFICANT CHANGE UP (ref 3.5–5.3)
POTASSIUM SERPL-SCNC: 4.4 MMOL/L — SIGNIFICANT CHANGE UP (ref 3.5–5.3)
RBC # BLD: 3.18 M/UL — LOW (ref 4.2–5.8)
RBC # FLD: 13.7 % — SIGNIFICANT CHANGE UP (ref 10.3–14.5)
SODIUM SERPL-SCNC: 140 MMOL/L — SIGNIFICANT CHANGE UP (ref 135–145)
WBC # BLD: 6.55 K/UL — SIGNIFICANT CHANGE UP (ref 3.8–10.5)
WBC # FLD AUTO: 6.55 K/UL — SIGNIFICANT CHANGE UP (ref 3.8–10.5)

## 2020-01-24 PROCEDURE — 99232 SBSQ HOSP IP/OBS MODERATE 35: CPT

## 2020-01-24 PROCEDURE — C2623: CPT

## 2020-01-24 PROCEDURE — 93005 ELECTROCARDIOGRAM TRACING: CPT

## 2020-01-24 PROCEDURE — 75710 ARTERY X-RAYS ARM/LEG: CPT | Mod: XU

## 2020-01-24 PROCEDURE — 85027 COMPLETE CBC AUTOMATED: CPT

## 2020-01-24 PROCEDURE — 80053 COMPREHEN METABOLIC PANEL: CPT

## 2020-01-24 PROCEDURE — C1887: CPT

## 2020-01-24 PROCEDURE — 37224: CPT

## 2020-01-24 PROCEDURE — 99238 HOSP IP/OBS DSCHRG MGMT 30/<: CPT

## 2020-01-24 PROCEDURE — C1769: CPT

## 2020-01-24 PROCEDURE — 80048 BASIC METABOLIC PNL TOTAL CA: CPT

## 2020-01-24 PROCEDURE — C1760: CPT

## 2020-01-24 PROCEDURE — C1894: CPT

## 2020-01-24 RX ORDER — AMLODIPINE BESYLATE 2.5 MG/1
1 TABLET ORAL
Qty: 0 | Refills: 0 | DISCHARGE
Start: 2020-01-24

## 2020-01-24 RX ORDER — CLOPIDOGREL BISULFATE 75 MG/1
1 TABLET, FILM COATED ORAL
Qty: 90 | Refills: 4
Start: 2020-01-24 | End: 2021-04-17

## 2020-01-24 RX ADMIN — CLOPIDOGREL BISULFATE 75 MILLIGRAM(S): 75 TABLET, FILM COATED ORAL at 05:07

## 2020-01-24 RX ADMIN — Medication 10 MILLIGRAM(S): at 05:07

## 2020-01-24 RX ADMIN — Medication 81 MILLIGRAM(S): at 05:07

## 2020-01-24 RX ADMIN — PANTOPRAZOLE SODIUM 40 MILLIGRAM(S): 20 TABLET, DELAYED RELEASE ORAL at 05:07

## 2020-01-24 RX ADMIN — SODIUM CHLORIDE 3 MILLILITER(S): 9 INJECTION INTRAMUSCULAR; INTRAVENOUS; SUBCUTANEOUS at 05:10

## 2020-01-24 NOTE — DISCHARGE NOTE NURSING/CASE MANAGEMENT/SOCIAL WORK - PATIENT PORTAL LINK FT
You can access the FollowMyHealth Patient Portal offered by [facility] by registering at the following website: [email or web address]. By joining [employer]’s FollowMyHealth portal, you will also be able to view your health information using other applications (apps) compatible with our system.

## 2020-01-24 NOTE — DISCHARGE NOTE PROVIDER - CARE PROVIDERS DIRECT ADDRESSES
[email or web address]

## 2020-01-24 NOTE — DISCHARGE NOTE PROVIDER - NSDCMRMEDTOKEN_GEN_ALL_CORE_FT
amLODIPine 5 mg oral tablet: 1 tab(s) orally once a day  amLODIPine 5 mg oral tablet: 1 tab(s) orally once a day  clopidogrel 75 mg oral tablet: 1 tab(s) orally once a day  Ecotrin Adult Low Strength 81 mg oral delayed release tablet: 1 tab(s) orally once a day  hydrALAZINE 10 mg oral tablet: 1 tab(s) orally 3 times a day MDD:3  pantoprazole 40 mg oral delayed release tablet: 1 tab(s) orally once a day  rosuvastatin 40 mg oral tablet: 1 tab(s) orally once a day amLODIPine 5 mg oral tablet: 1 tab(s) orally once a day  clopidogrel 75 mg oral tablet: 1 tab(s) orally once a day  Ecotrin Adult Low Strength 81 mg oral delayed release tablet: 1 tab(s) orally once a day  hydrALAZINE 10 mg oral tablet: 1 tab(s) orally 3 times a day MDD:3  pantoprazole 40 mg oral delayed release tablet: 1 tab(s) orally once a day  rosuvastatin 40 mg oral tablet: 1 tab(s) orally once a day

## 2020-01-24 NOTE — PROGRESS NOTE ADULT - SUBJECTIVE AND OBJECTIVE BOX
Vascular Cardiology  Progress note     SPECTRA [id number]              EMAIL [email or web address]   OFFICE [phone]    CC:  Lifestyle limiting LE claudication    INTERVAL HISTORY: Doing well post intervention.  No CP or SOB.  Breathing well.  No palpitations.  Groin feels fine.  No pain in the legs.      Allergies  No Known Allergies    MEDICATIONS:  amLODIPine   Tablet 5 milliGRAM(s) Oral daily  aspirin enteric coated 81 milliGRAM(s) Oral daily  clopidogrel Tablet 75 milliGRAM(s) Oral daily  hydrALAZINE 10 milliGRAM(s) Oral three times a day  pantoprazole    Tablet 40 milliGRAM(s) Oral before breakfast  atorvastatin 80 milliGRAM(s) Oral at bedtime    PAST MEDICAL & SURGICAL HISTORY:  CKD (chronic kidney disease) stage 4, GFR 15-29 ml/min  Claudication in peripheral vascular disease  Former smoker  CKD (chronic kidney disease)  AAA (abdominal aortic aneurysm): 3.5 CM  Renal artery stenosis  HTN (hypertension)  HLD (hyperlipidemia)  History of stent insertion of renal artery  H/O knee surgery    FAMILY HISTORY:  Family history of brain aneurysm  Family history of cancer    SOCIAL HISTORY:  unchanged    REVIEW OF SYSTEMS:  CONSTITUTIONAL: No fever  EYES: No eye pain, visual disturbances  ENT: No sinus or throat pain  NECK: No pain or stiffness  RESPIRATORY: No cough, wheezing, chills or hemoptysis; No Shortness of Breath  CARDIOVASCULAR: No chest pain, palpitations, passing out, dizziness, or leg swelling  GASTROINTESTINAL: No abdominal or epigastric pain. No nausea, vomiting, or hematemesis;  No melena or hematochezia.  GENITOURINARY: No dysuria, hematuria  NEUROLOGICAL: No headaches, memory loss, loss of strength, numbness, or tremors  SKIN: No itching, burning, rashes, or lesions   LYMPH Nodes: No enlarged glands  ENDOCRINE: No heat or cold intolerance; No hair loss  MUSCULOSKELETAL: No joint pain or swelling; No muscle, back, or extremity pain  PSYCHIATRIC: No depression, anxiety, mood swings, or difficulty sleeping  HEME/LYMPH: No easy bruising, or bleeding gums  ALLERGY AND IMMUNOLOGIC: No hives or eczema	    [ x] All others negative	    PHYSICAL EXAM:  T(C): 36.7 (01-24-20 @ 04:59), Max: 36.7 (01-23-20 @ 20:33)  HR: 53 (01-24-20 @ 05:06) (52 - 64)  BP: 127/62 (01-24-20 @ 04:59) (123/79 - 135/54)  RR: 18 (01-24-20 @ 04:59) (17 - 18)  SpO2: 100% (01-24-20 @ 04:59) (97% - 100%)  I&O's Summary    23 Jan 2020 07:01  -  24 Jan 2020 07:00  --------------------------------------------------------  IN: 825 mL / OUT: 400 mL / NET: 425 mL    24 Jan 2020 07:01  -  24 Jan 2020 09:16  --------------------------------------------------------  IN: 240 mL / OUT: 0 mL / NET: 240 mL    Appearance: Normal	  HEENT:  NC/AT  Cardiovascular: Normal S1 S2, No JVD, No murmurs, No edema  Respiratory: Lungs clear to auscultation	  Psychiatry: A & O x 3, Mood & affect appropriate  Gastrointestinal:  Soft, Non-tender, + BS	  Skin: No rashes, No ecchymoses, No cyanosis	  Neurologic: Non-focal  Extremities: Normal range of motion, No clubbing, cyanosis.  Vascular:   L Groin access site:  No hematoma, non-tender, soft.    Right DP:  Palpable Left DP: Palpable  Right PT:  Palpable Left PT:  Palpable    LABS:	 	    CBC Full  -  ( 24 Jan 2020 03:15 )  WBC Count : 6.55 K/uL  Hemoglobin : 9.5 g/dL  Hematocrit : 30.6 %  Platelet Count - Automated : 157 K/uL  Mean Cell Volume : 96.2 fl  Mean Cell Hemoglobin : 29.9 pg  Mean Cell Hemoglobin Concentration : 31.0 gm/dL  Auto Neutrophil # : x  Auto Lymphocyte # : x  Auto Monocyte # : x  Auto Eosinophil # : x  Auto Basophil # : x  Auto Neutrophil % : x  Auto Lymphocyte % : x  Auto Monocyte % : x  Auto Eosinophil % : x  Auto Basophil % : x    01-24    140  |  103  |  28<H>  ----------------------------<  97  4.4   |  24  |  2.00<H>  01-23    140  |  101  |  35<H>  ----------------------------<  97  3.9   |  26  |  2.25<H>    Ca    9.4      24 Jan 2020 03:15  Ca    9.6      23 Jan 2020 07:52    TPro  7.3  /  Alb  4.2  /  TBili  0.5  /  DBili  x   /  AST  21  /  ALT  13  /  AlkPhos  72  01-23    Assessment:  1. PAD with Lifestyle Limiting Claudication     s/p successful revascularization: PTA/ Drug Coated Balloon R SFA  2. HTN  3. HLD  4. CKD stage 3-4, Cr. 2.2 improved to 2.0  5. Chronic Anemia, Stable  6. Small Aortic Aneurysm      Plan:  1. Doing well post procedure  2.  Continue home medications including Statin  3.  Continue antiplatelet therapy with Aspirin and Plavix  4.  We discussed groin precautions (no driving 3 days, no heavy lifting 2 weeks, no swimming or baths 2 weeks, etc)  5.  Discharge home today with followup in 2 weeks with Dr. Villalpando.     Thank you  LUCILA Franco  Vascular Cardiology Service   [id number] - [id number]  Office [phone]  email:   [email or web address]

## 2020-01-24 NOTE — DISCHARGE NOTE PROVIDER - NSDCCPTREATMENT_GEN_ALL_CORE_FT
PRINCIPAL PROCEDURE  Procedure: Angioplasty, artery, peripheral  Findings and Treatment: Drug eluting ballon Right SFA PRINCIPAL PROCEDURE  Procedure: Angioplasty, artery, peripheral  Findings and Treatment: Drug eluting ballon Right SFA via left femoral artery s/p Perclose device

## 2020-01-24 NOTE — DISCHARGE NOTE PROVIDER - HOSPITAL COURSE
HPI:    70 y/o Male no implantable device with HTN, HLD, CKD 4 (BUN/Cr 29/2.2, GFR 29-30 on 6/2019), renal insufficiency s/p left renal artery stent (6/2019), former smoker, aortic aneurysm presents for peripheral angiogram with possible intervention. Pt reports he get bilateral LE claudication (not able to walk more 2-3 blocks without pain) for about a year, evaluated by Dr. Villalpando and referred for cath.         MRA abdomen/pelvis performed on 6/2/2019 - Diffuse atheromatous ectasia of the abdominal aorta. Prominent plaque ulcerations without focal aneurysm. 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 Severe L renal artery stenosis.     Card: Dr. Renee    Renal: Dr. Payne        10/22/2019: SHARONDA RT 0.95, left 0.96, TBI right 0.46, left 0.53    borderline bilateral large vessel PAD, possible distal small vessel disease bilaterally    MR Angio chest from June 2019:    1.  No aortic aneurysm.    2.  Extensive atherosclerosis of the descending aorta. Several small penetrating ulcers through the course of the descending aorta some of which are more conspicuous compared to prior chest CT which can be secondary to different technique.    MR Angio of Abdomen: IMPRESSION: Diffuse atheromatous ectasia of the abdominal aorta with prominent plaque ulcerations but without focal aneurysm. Severe mesenteric arterial disease, with the mesenteric circulation supplied by a stenotic celiac artery. Proximal occlusions of the SMA and EZRA. Unchanged since CT of 3/5/2019. Severe left renal artery stenosis        MR Angio of pelvis: IMPRESSION:     Diffuse atheromatous ectasia of the abdominal aorta with prominent plaque ulcerations but without focal aneurysm.    Severe mesenteric arterial disease, with the mesenteric circulation supplied by a stenotic celiac artery. Proximal occlusions of the SMA and EZRA. Unchanged since CT of 3/5/2019.Severe left renal artery stenosis. (23 Jan 2020 07:19)            01/23/2020 Peripheral angiogram right SFA drug eluting ballon via LFA access ( ASA and Plavix )     Do not stop you Aspirin or Plavix unless instructed to do so by your cardiologist.

## 2020-01-24 NOTE — DISCHARGE NOTE NURSING/CASE MANAGEMENT/SOCIAL WORK - NSDCVIVACCINE_GEN_ALL_CORE_FT
No Vaccines Administered. Mastoid Interpolation Flap Text: A decision was made to reconstruct the defect utilizing an interpolation axial flap and a staged reconstruction.  A telfa template was made of the defect.  This telfa template was then used to outline the mastoid interpolation flap.  The donor area for the pedicle flap was then injected with anesthesia.  The flap was excised through the skin and subcutaneous tissue down to the layer of the underlying musculature.  The pedicle flap was carefully excised within this deep plane to maintain its blood supply.  The edges of the donor site were undermined.   The donor site was closed in a primary fashion.  The pedicle was then rotated into position and sutured.  Once the tube was sutured into place, adequate blood supply was confirmed with blanching and refill.  The pedicle was then wrapped with xeroform gauze and dressed appropriately with a telfa and gauze bandage to ensure continued blood supply and protect the attached pedicle.

## 2020-01-24 NOTE — DISCHARGE NOTE NURSING/CASE MANAGEMENT/SOCIAL WORK - NSDCFUADDAPPT_GEN_ALL_CORE_FT
Follow up with your cardiologist and primary care provider in 1-2 weeks.  followup with Dr. Villalpando in 2 weeks

## 2020-01-24 NOTE — DISCHARGE NOTE PROVIDER - PROVIDER TOKENS
PROVIDER:[TOKEN:[[id number]]] PROVIDER:[TOKEN:[[id number]]],PROVIDER:[TOKEN:[[id number]]]

## 2020-01-24 NOTE — DISCHARGE NOTE PROVIDER - NSDCCPCAREPLAN_GEN_ALL_CORE_FT
PRINCIPAL DISCHARGE DIAGNOSIS  Diagnosis: PAD (peripheral artery disease)  Assessment and Plan of Treatment: Pt remains leg pain free and understands post cath discharge instructions   No heavy lifting, strenuous activity, bending, straining or unnecessary stair climbing  for 2 weeks. No sex for 1 week.  No driving for 2 days. You may shower 24 hours following procedure but avoid baths and swimming for 1 week. Check groin site for bleeding and/or swelling daily following procedure. Call your doctor/cardiologist immediately should it occur or if you have increased/persistent pain at the site. Follow up with your cardiologist in 1- 2 weeks. You may call [facility] at [phone] or [phone] after office hours and weekends  with any questions or concerns following your procedure. Take medications as prescribed.      SECONDARY DISCHARGE DIAGNOSES  Diagnosis: HLD (hyperlipidemia)  Assessment and Plan of Treatment: Your LDL cholesterol will be less than 70mg/dL   Continue with your cholesterol medications. Eat a heart healthy diet that is low in saturated fats and salt, and includes whole grains, fruits, vegetables and lean protein; exercise regularly (consult with your physician or cardiologist first); maintain a heart healthy weight. Continue to follow with your primary physician or cardiologist for treatment goals, continue medication, have liver function testing every 3 months as anti lipid medications can cause liver irritation. If you smoke - quit (A resource to help you stop smoking is the [facility] – phone number [phone].).    Diagnosis: HTN (hypertension)  Assessment and Plan of Treatment: Your LDL cholesterol will be less than 70mg/dL   Continue with your cholesterol medications. Eat a heart healthy diet that is low in saturated fats and salt, and includes whole grains, fruits, vegetables and lean protein; exercise regularly (consult with your physician or cardiologist first); maintain a heart healthy weight. Continue to follow with your primary physician or cardiologist for treatment goals, continue medication, have liver function testing every 3 months as anti lipid medications can cause liver irritation. If you smoke - quit (A resource to help you stop smoking is the [facility] [employer] Tobacco Control – phone number [phone].).

## 2020-01-24 NOTE — DISCHARGE NOTE PROVIDER - CARE PROVIDER_API CALL
Carmelo Dunaway (MD)  Cardiovascular Disease; Endovascular Medicine; Interventional Cardiology; Vascular Medicine  [address] [place] [address]  Phone: [phone]  Fax: [phone]  Follow Up Time: Carmelo Dunaway (MD)  Cardiovascular Disease; Endovascular Medicine; Interventional Cardiology; Vascular Medicine  [address] [place] [address]  Phone: [phone]  Fax: [phone]  Follow Up Time:     Amarjit Villalpando (DO)  Cardiovascular Disease; Internal Medicine; Nuclear Cardiology  [address] [place] [address]  Phone: [phone]  Fax: [phone]  Follow Up Time:

## 2020-02-08 PROBLEM — I73.9 PERIPHERAL VASCULAR DISEASE, UNSPECIFIED: Chronic | Status: ACTIVE | Noted: 2020-01-23

## 2020-02-08 PROBLEM — Z87.891 PERSONAL HISTORY OF NICOTINE DEPENDENCE: Chronic | Status: ACTIVE | Noted: 2020-01-23

## 2020-02-08 PROBLEM — N18.4 CHRONIC KIDNEY DISEASE, STAGE 4 (SEVERE): Chronic | Status: ACTIVE | Noted: 2020-01-23

## 2020-02-11 ENCOUNTER — APPOINTMENT (OUTPATIENT)
Dept: CARDIOLOGY | Facility: CLINIC | Age: 70
End: 2020-02-11
Payer: MEDICARE

## 2020-02-11 PROCEDURE — 99214 OFFICE O/P EST MOD 30 MIN: CPT

## 2020-02-19 VITALS
HEART RATE: 63 BPM | WEIGHT: 157 LBS | BODY MASS INDEX: 23.79 KG/M2 | OXYGEN SATURATION: 97 % | RESPIRATION RATE: 16 BRPM | DIASTOLIC BLOOD PRESSURE: 84 MMHG | SYSTOLIC BLOOD PRESSURE: 146 MMHG | HEIGHT: 68 IN

## 2020-02-19 NOTE — REVIEW OF SYSTEMS
[Fever] : no fever [Dyspnea on exertion] : not dyspnea during exertion [Feeling Fatigued] : not feeling fatigued [Lower Ext Edema] : no extremity edema [Chest Pain] : no chest pain [Change In The Stool] : no change in stool [Leg Claudication] : no intermittent leg claudication [Abdominal Pain] : abdominal pain [Negative] : Heme/Lymph

## 2020-02-19 NOTE — PHYSICAL EXAM
[Normal Appearance] : normal appearance [General Appearance - Well Developed] : well developed [Well Groomed] : well groomed [General Appearance - Well Nourished] : well nourished [No Deformities] : no deformities [General Appearance - In No Acute Distress] : no acute distress [Normal Conjunctiva] : the conjunctiva exhibited no abnormalities [Eyelids - No Xanthelasma] : the eyelids demonstrated no xanthelasmas [Normal Oral Mucosa] : normal oral mucosa [No Oral Cyanosis] : no oral cyanosis [No Oral Pallor] : no oral pallor [No Jugular Venous Mckee A Waves] : no jugular venous mckee A waves [Normal Jugular Venous V Waves Present] : normal jugular venous V waves present [Normal Jugular Venous A Waves Present] : normal jugular venous A waves present [Respiration, Rhythm And Depth] : normal respiratory rhythm and effort [Heart Rate And Rhythm] : heart rate and rhythm were normal [Auscultation Breath Sounds / Voice Sounds] : lungs were clear to auscultation bilaterally [Exaggerated Use Of Accessory Muscles For Inspiration] : no accessory muscle use [Heart Sounds] : normal S1 and S2 [Murmurs] : no murmurs present [Abdomen Soft] : soft [Abdomen Tenderness] : non-tender [Abdomen Mass (___ Cm)] : no abdominal mass palpated [Gait - Sufficient For Exercise Testing] : the gait was sufficient for exercise testing [Abnormal Walk] : normal gait [Cyanosis, Localized] : no localized cyanosis [Nail Clubbing] : no clubbing of the fingernails [FreeTextEntry1] : Stong pedal pulses.  L groin stable.  [Petechial Hemorrhages (___cm)] : no petechial hemorrhages [] : no rash [Skin Color & Pigmentation] : normal skin color and pigmentation [No Venous Stasis] : no venous stasis [No Skin Ulcers] : no skin ulcer [Skin Lesions] : no skin lesions [No Xanthoma] : no  xanthoma was observed [Oriented To Time, Place, And Person] : oriented to person, place, and time [Affect] : the affect was normal [Mood] : the mood was normal [No Anxiety] : not feeling anxious

## 2020-02-19 NOTE — HISTORY OF PRESENT ILLNESS
[FreeTextEntry1] : Followup visit  \par Not smoking  \par Had peripheral intervention on the R SFA (balloon angioplasty and PTA)\par Left leg without signficant disease.  \par He remains on aspirin and plavix\par He was able to walk 5 miles on the [facility] earlier this week\par no right leg pain or claudication whatsoever\par The left calf has MILD pain which is not interfering with his quality of life\par Groin isfine on the left side\par Making urine without issues.  Plans to see Dr. Payne in followup\par

## 2020-02-19 NOTE — ASSESSMENT
[FreeTextEntry1] : Assessment:\par 1. AAA 3.5 cm - now 3.8cm\par 2.  Aortic Mural thrombus\par 3.  SMA stenosis\par 4.  L renal artery stenosis with decreased kidney size - s/p stent\par 5.  CKD\par 6.  heavy former smoker\par 7.  Severe lifestyle limiting darien 3 claudciation\par  sp R SFA intervention Jan 23, 2020\par \par \par Plan:\par 1.  Continue aspirin and Plavix for now\par 2.  Continue with high intensity statin therapy\par 3.  Continue smoking cessation.\par 4.  continue walking\par 5.  We discussed red flags such as new claudication/rest pain/ tissue loss that would warrant a sooner appointment\par 6.  Return in 6 months

## 2020-07-14 ENCOUNTER — APPOINTMENT (OUTPATIENT)
Dept: CARDIOLOGY | Facility: CLINIC | Age: 70
End: 2020-07-14
Payer: MEDICARE

## 2020-07-14 VITALS
OXYGEN SATURATION: 98 % | HEART RATE: 54 BPM | WEIGHT: 151 LBS | DIASTOLIC BLOOD PRESSURE: 70 MMHG | HEIGHT: 68 IN | BODY MASS INDEX: 22.88 KG/M2 | SYSTOLIC BLOOD PRESSURE: 130 MMHG | RESPIRATION RATE: 16 BRPM

## 2020-07-14 PROCEDURE — 99214 OFFICE O/P EST MOD 30 MIN: CPT

## 2020-07-14 NOTE — HISTORY OF PRESENT ILLNESS
[FreeTextEntry1] : Followup visit  7/14/2020\par Not smoking  \par  He remains on aspirin and plavix\par  Walks 3-7 miles per day with his wife\par Right leg does not bother him at all\par The left calf cramps with very brisk walking\par Most recent creatinine on July 7thj was 2.23 (down from 2.4 in May )\par Not working due to covid\par breathing ok\par \par Meds\par Aspirin\par Plavix\par cresotr 40\par Amlodipine 5mg daily \par hydralazine 10mg TID\par protonix.

## 2020-07-14 NOTE — ASSESSMENT
[FreeTextEntry1] : Assessment:\par 1. AAA 3.5 cm - now 3.8cm\par 2.  Aortic Mural thrombus\par 3.  SMA stenosis\par 4.  L renal artery stenosis with decreased kidney size - s/p stent\par 5.  CKD\par 6.  heavy former smoker\par 7.  Severe lifestyle limiting darien 3 claudciation\par  sp R SFA intervention Jan 23, 2020\par \par \par Plan:\par 1.  Continue aspirin and Plavix for now\par 2.  Continue with high intensity statin therapy\par 3.  Continue smoking cessation.\par 4.  continue walking\par 5.  We discussed red flags such as new claudication/rest pain/ tissue loss that would warrant a sooner appointment\par 6. Return in 1 year, will need US aorta at that time for surveillance of AAA

## 2020-07-14 NOTE — REVIEW OF SYSTEMS
[Fever] : no fever [Feeling Fatigued] : not feeling fatigued [Dyspnea on exertion] : not dyspnea during exertion [Chest Pain] : no chest pain [Lower Ext Edema] : no extremity edema [Leg Claudication] : no intermittent leg claudication [Change In The Stool] : no change in stool [Abdominal Pain] : abdominal pain [Negative] : Heme/Lymph

## 2020-07-14 NOTE — PHYSICAL EXAM
[General Appearance - Well Developed] : well developed [Normal Appearance] : normal appearance [Well Groomed] : well groomed [General Appearance - Well Nourished] : well nourished [No Deformities] : no deformities [General Appearance - In No Acute Distress] : no acute distress [Normal Conjunctiva] : the conjunctiva exhibited no abnormalities [Eyelids - No Xanthelasma] : the eyelids demonstrated no xanthelasmas [Normal Oral Mucosa] : normal oral mucosa [No Oral Pallor] : no oral pallor [No Oral Cyanosis] : no oral cyanosis [Normal Jugular Venous A Waves Present] : normal jugular venous A waves present [Normal Jugular Venous V Waves Present] : normal jugular venous V waves present [No Jugular Venous Mckee A Waves] : no jugular venous mckee A waves [Respiration, Rhythm And Depth] : normal respiratory rhythm and effort [Exaggerated Use Of Accessory Muscles For Inspiration] : no accessory muscle use [Auscultation Breath Sounds / Voice Sounds] : lungs were clear to auscultation bilaterally [Heart Rate And Rhythm] : heart rate and rhythm were normal [Heart Sounds] : normal S1 and S2 [Murmurs] : no murmurs present [Abdomen Tenderness] : non-tender [Abdomen Soft] : soft [Abdomen Mass (___ Cm)] : no abdominal mass palpated [Abnormal Walk] : normal gait [Gait - Sufficient For Exercise Testing] : the gait was sufficient for exercise testing [Cyanosis, Localized] : no localized cyanosis [Nail Clubbing] : no clubbing of the fingernails [Petechial Hemorrhages (___cm)] : no petechial hemorrhages [Skin Color & Pigmentation] : normal skin color and pigmentation [FreeTextEntry1] : Stong pedal pulses.    [] : no rash [No Venous Stasis] : no venous stasis [Skin Lesions] : no skin lesions [No Xanthoma] : no  xanthoma was observed [No Skin Ulcers] : no skin ulcer [Affect] : the affect was normal [Oriented To Time, Place, And Person] : oriented to person, place, and time [Mood] : the mood was normal [No Anxiety] : not feeling anxious

## 2020-10-19 NOTE — CHART NOTE - NSCHARTNOTESELECT_GEN_ALL_CORE
Detail Level: Detailed Quality 110: Preventive Care And Screening: Influenza Immunization: Influenza Immunization previously received during influenza season hematoma/Event Note

## 2021-10-19 ENCOUNTER — APPOINTMENT (OUTPATIENT)
Dept: CARDIOLOGY | Facility: CLINIC | Age: 71
End: 2021-10-19
Payer: MEDICARE

## 2021-10-19 PROCEDURE — 99214 OFFICE O/P EST MOD 30 MIN: CPT

## 2021-10-25 NOTE — HISTORY OF PRESENT ILLNESS
[FreeTextEntry1] : 10/19/2021\par \par Not smoking  \par  He remains on aspirin and plavix\par  Walks 10 per day with his wife during the summer\par Right leg does not bother him at all\par  Not working  \par review of prior CT results, there is a mention of a 5 cm thoracic aortic aneurysm\par Prior US aorta showed 3.7 cm aneurysm \par \par \par Meds\par Aspirin\par Plavix\par crestor  40\par Amlodipine 5mg daily \par hydralazine 10mg TID\par protonix.

## 2021-10-25 NOTE — PHYSICAL EXAM
[General Appearance - Well Developed] : well developed [Normal Appearance] : normal appearance [Well Groomed] : well groomed [General Appearance - Well Nourished] : well nourished [No Deformities] : no deformities [General Appearance - In No Acute Distress] : no acute distress [Normal Conjunctiva] : the conjunctiva exhibited no abnormalities [Eyelids - No Xanthelasma] : the eyelids demonstrated no xanthelasmas [Normal Oral Mucosa] : normal oral mucosa [No Oral Pallor] : no oral pallor [No Oral Cyanosis] : no oral cyanosis [Normal Jugular Venous A Waves Present] : normal jugular venous A waves present [Normal Jugular Venous V Waves Present] : normal jugular venous V waves present [No Jugular Venous Mckee A Waves] : no jugular venous mckee A waves [Respiration, Rhythm And Depth] : normal respiratory rhythm and effort [Exaggerated Use Of Accessory Muscles For Inspiration] : no accessory muscle use [Auscultation Breath Sounds / Voice Sounds] : lungs were clear to auscultation bilaterally [Heart Rate And Rhythm] : heart rate and rhythm were normal [Heart Sounds] : normal S1 and S2 [Murmurs] : no murmurs present [Abdomen Tenderness] : non-tender [Abdomen Soft] : soft [Abdomen Mass (___ Cm)] : no abdominal mass palpated [Abnormal Walk] : normal gait [Gait - Sufficient For Exercise Testing] : the gait was sufficient for exercise testing [Nail Clubbing] : no clubbing of the fingernails [Cyanosis, Localized] : no localized cyanosis [Petechial Hemorrhages (___cm)] : no petechial hemorrhages [Skin Color & Pigmentation] : normal skin color and pigmentation [] : no rash [No Venous Stasis] : no venous stasis [Skin Lesions] : no skin lesions [No Skin Ulcers] : no skin ulcer [No Xanthoma] : no  xanthoma was observed [Oriented To Time, Place, And Person] : oriented to person, place, and time [Affect] : the affect was normal [Mood] : the mood was normal [No Anxiety] : not feeling anxious [FreeTextEntry1] : Stong pedal pulses.

## 2021-10-25 NOTE — ASSESSMENT
[FreeTextEntry1] : Assessment:\par 1. AAA 3.5 cm - now 3.8cm\par 2.  Aortic Mural thrombus\par 3.  SMA stenosis\par 4.  L renal artery stenosis with decreased kidney size - s/p stent\par 5.  CKD\par 6.  heavy former smoker\par 7.  Severe lifestyle limiting darien 3 claudciation  sp R SFA intervention Jan 23, 2020\par \par \par Plan:\par 1.  Continue aspirin and Plavix for now\par 2.  Continue with high intensity statin therapy\par 3.  Continue smoking cessation.\par 4.  continue walking\par 5.   Will favor CT C/A/P non contrast (due to renal function) to assess entire aorta (rule out thoracic aneurysm - which was seen on prior CT scan in 2019 measuring 5cm and also to see abdominal aorta 3.7cm)\par 6.  If CT not covered, then US abdominal aorta paired with non - con CT chest

## 2022-10-17 ENCOUNTER — TRANSCRIPTION ENCOUNTER (OUTPATIENT)
Age: 72
End: 2022-10-17

## 2022-11-01 ENCOUNTER — APPOINTMENT (OUTPATIENT)
Dept: CARDIOLOGY | Facility: CLINIC | Age: 72
End: 2022-11-01

## 2022-11-01 DIAGNOSIS — I71.20 THORACIC AORTIC ANEURYSM, WITHOUT RUPTURE, UNSPECIFIED: ICD-10-CM

## 2022-11-01 PROCEDURE — 99214 OFFICE O/P EST MOD 30 MIN: CPT

## 2022-11-10 NOTE — HISTORY OF PRESENT ILLNESS
[FreeTextEntry1] : 11/1/2022\par Followup visit \par unrestricted in his walking\par has strong pedal pulses on exam\par carotid duplex in July 2022 with R moderate and left mild ICA stenosis\par US aorta with 3.8cm AAA July 2022\par Legs feel ok\par no wounds\par not smoking\par \par \par \par 10/19/2021\par \par Not smoking  \par  He remains on aspirin and plavix\par  Walks 10 per day with his wife during the summer\par Right leg does not bother him at all\par  Not working  \par review of prior CT results, there is a mention of a 5 cm thoracic aortic aneurysm\par Prior US aorta showed 3.7 cm aneurysm \par \par \par Meds\par Aspirin\par Plavix\par crestor  40\par Amlodipine 5mg daily \par hydralazine 10mg TID\par protonix.

## 2022-11-10 NOTE — PHYSICAL EXAM
[General Appearance - Well Developed] : well developed [Normal Appearance] : normal appearance [Well Groomed] : well groomed [General Appearance - Well Nourished] : well nourished [No Deformities] : no deformities [General Appearance - In No Acute Distress] : no acute distress [Normal Conjunctiva] : the conjunctiva exhibited no abnormalities [Eyelids - No Xanthelasma] : the eyelids demonstrated no xanthelasmas [Normal Oral Mucosa] : normal oral mucosa [No Oral Pallor] : no oral pallor [No Oral Cyanosis] : no oral cyanosis [Normal Jugular Venous A Waves Present] : normal jugular venous A waves present [Normal Jugular Venous V Waves Present] : normal jugular venous V waves present [No Jugular Venous Mckee A Waves] : no jugular venous mckee A waves [Respiration, Rhythm And Depth] : normal respiratory rhythm and effort [Exaggerated Use Of Accessory Muscles For Inspiration] : no accessory muscle use [Auscultation Breath Sounds / Voice Sounds] : lungs were clear to auscultation bilaterally [Heart Rate And Rhythm] : heart rate and rhythm were normal [Heart Sounds] : normal S1 and S2 [Murmurs] : no murmurs present [Abdomen Soft] : soft [Abdomen Tenderness] : non-tender [Abdomen Mass (___ Cm)] : no abdominal mass palpated [Abnormal Walk] : normal gait [Gait - Sufficient For Exercise Testing] : the gait was sufficient for exercise testing [Nail Clubbing] : no clubbing of the fingernails [Cyanosis, Localized] : no localized cyanosis [Petechial Hemorrhages (___cm)] : no petechial hemorrhages [Skin Color & Pigmentation] : normal skin color and pigmentation [] : no rash [No Venous Stasis] : no venous stasis [Skin Lesions] : no skin lesions [No Skin Ulcers] : no skin ulcer [No Xanthoma] : no  xanthoma was observed [Oriented To Time, Place, And Person] : oriented to person, place, and time [Affect] : the affect was normal [Mood] : the mood was normal [No Anxiety] : not feeling anxious [FreeTextEntry1] : Stong pedal pulses.

## 2022-11-10 NOTE — ASSESSMENT
[FreeTextEntry1] : Assessment:\par 1. AAA 3.5 cm - now 3.8cm\par 2.  Aortic Mural thrombus\par 3.  SMA stenosis\par 4.  L renal artery stenosis with decreased kidney size - s/p stent\par 5.  CKD\par 6.  heavy former smoker\par 7.  Severe lifestyle limiting darien 3 claudciation  sp R SFA intervention Jan 23, 2020\par \par \par Plan:\par 1.  Continue aspirin and Plavix for now\par 2.  Continue with high intensity statin therapy\par 3.  Continue smoking cessation.\par 4.  continue walking\par 5.  Will ask for non-contrast CT chest to eval for known thoracic  aortic aneurysm seen on imaging in 2019.  \par 6  Repeat carotid in summer of 2023\par 7.  Repeat US aorta in summer of 2023\par 8.  Return in 1 year

## 2023-01-23 ENCOUNTER — APPOINTMENT (OUTPATIENT)
Dept: CT IMAGING | Facility: CLINIC | Age: 73
End: 2023-01-23
Payer: MEDICARE

## 2023-01-23 PROCEDURE — 71250 CT THORAX DX C-: CPT

## 2023-11-28 ENCOUNTER — APPOINTMENT (OUTPATIENT)
Dept: CARDIOLOGY | Facility: CLINIC | Age: 73
End: 2023-11-28
Payer: MEDICARE

## 2023-11-28 DIAGNOSIS — I71.40 ABDOMINAL AORTIC ANEURYSM, WITHOUT RUPTURE, UNSPECIFIED: ICD-10-CM

## 2023-11-28 DIAGNOSIS — I70.1 ATHEROSCLEROSIS OF RENAL ARTERY: ICD-10-CM

## 2023-11-28 DIAGNOSIS — I73.9 PERIPHERAL VASCULAR DISEASE, UNSPECIFIED: ICD-10-CM

## 2023-11-28 PROCEDURE — 99214 OFFICE O/P EST MOD 30 MIN: CPT

## 2025-01-21 ENCOUNTER — APPOINTMENT (OUTPATIENT)
Dept: CARDIOLOGY | Facility: CLINIC | Age: 75
End: 2025-01-21
Payer: MEDICARE

## 2025-01-21 DIAGNOSIS — I70.1 ATHEROSCLEROSIS OF RENAL ARTERY: ICD-10-CM

## 2025-01-21 DIAGNOSIS — I71.20 THORACIC AORTIC ANEURYSM, WITHOUT RUPTURE, UNSPECIFIED: ICD-10-CM

## 2025-01-21 DIAGNOSIS — I10 ESSENTIAL (PRIMARY) HYPERTENSION: ICD-10-CM

## 2025-01-21 PROCEDURE — G2211 COMPLEX E/M VISIT ADD ON: CPT

## 2025-01-21 PROCEDURE — 99214 OFFICE O/P EST MOD 30 MIN: CPT

## 2025-04-29 DIAGNOSIS — I65.29 OCCLUSION AND STENOSIS OF UNSPECIFIED CAROTID ARTERY: ICD-10-CM
